# Patient Record
Sex: FEMALE | Race: WHITE | NOT HISPANIC OR LATINO | Employment: FULL TIME | URBAN - METROPOLITAN AREA
[De-identification: names, ages, dates, MRNs, and addresses within clinical notes are randomized per-mention and may not be internally consistent; named-entity substitution may affect disease eponyms.]

---

## 2019-06-18 ENCOUNTER — HOSPITAL ENCOUNTER (EMERGENCY)
Facility: HOSPITAL | Age: 59
Discharge: HOME | End: 2019-06-18
Attending: EMERGENCY MEDICINE | Admitting: EMERGENCY MEDICINE
Payer: COMMERCIAL

## 2019-06-18 VITALS
RESPIRATION RATE: 16 BRPM | HEART RATE: 86 BPM | WEIGHT: 153 LBS | DIASTOLIC BLOOD PRESSURE: 62 MMHG | SYSTOLIC BLOOD PRESSURE: 135 MMHG | OXYGEN SATURATION: 95 %

## 2019-06-18 DIAGNOSIS — S01.81XA LACERATION OF FOREHEAD, INITIAL ENCOUNTER: Primary | ICD-10-CM

## 2019-06-18 PROCEDURE — 90715 TDAP VACCINE 7 YRS/> IM: CPT | Performed by: EMERGENCY MEDICINE

## 2019-06-18 PROCEDURE — 99282 EMERGENCY DEPT VISIT SF MDM: CPT

## 2019-06-18 PROCEDURE — 90471 IMMUNIZATION ADMIN: CPT

## 2019-06-18 RX ORDER — GINSENG 100 MG
1 CAPSULE ORAL ONCE
Status: COMPLETED | OUTPATIENT
Start: 2019-06-18 | End: 2019-06-18

## 2019-06-18 RX ORDER — LIDOCAINE HYDROCHLORIDE AND EPINEPHRINE 10; 10 MG/ML; UG/ML
1 INJECTION, SOLUTION INFILTRATION; PERINEURAL ONCE
Status: COMPLETED | OUTPATIENT
Start: 2019-06-18 | End: 2019-06-18

## 2019-06-18 RX ADMIN — TETANUS TOXOID, REDUCED DIPHTHERIA TOXOID AND ACELLULAR PERTUSSIS VACCINE, ADSORBED 0.5 ML: 5; 2.5; 8; 8; 2.5 SUSPENSION INTRAMUSCULAR at 22:54

## 2019-06-18 RX ADMIN — BACITRACIN ZINC 1 SMALL APPLICATION: 500 OINTMENT TOPICAL at 23:23

## 2019-06-18 RX ADMIN — LIDOCAINE HYDROCHLORIDE,EPINEPHRINE BITARTRATE 1 ML: 10; .01 INJECTION, SOLUTION INFILTRATION; PERINEURAL at 22:55

## 2021-02-12 NOTE — PROGRESS NOTES
FAMILY PRACTICE HEALTH MAINTENANCE OFFICE VISIT  [facility] - [facility]    NAME: Jenna Parada  AGE: 62 y o  SEX: female  : 1960     DATE: 2021    Assessment and Plan     1  Well adult exam    2  Encounter for hepatitis C screening test for low risk patient  -     Hepatitis C antibody; Future    3  Screening for cardiovascular condition  -     CBC and differential; Future  -     Comprehensive metabolic panel; Future  -     Lipid panel; Future  -     CBC and differential  -     Comprehensive metabolic panel  -     Lipid panel    4  Screening breast examination  -     Mammo screening bilateral w 3d & cad; Future; Expected date: 2021    5  Colon cancer screening  -     Cologuard; Future    6  BMI 24 0-24 9, adult        · Patient Counseling:   · Nutrition: Stressed importance of a well balanced diet, moderation of sodium/saturated fat, caloric balance and sufficient intake of fiber  · Exercise: Stressed the importance of regular exercise with a goal of 150 minutes per week  · Dental Health: Discussed daily flossing and brushing and regular dental visits     · Immunizations reviewed: Up To Date  · Discussed benefits of:  Colon Cancer Screening, Mammogram , Cervical Cancer screening and Screening labs  BMI Counseling: Body mass index is 24 37 kg/m²  Discussed with patient's BMI with her  The BMI is normal     Return in about 1 year (around 2022) for please print cologuard order  Chief Complaint     Chief Complaint   Patient presents with    Physical Exam     [facility]       History of Present Illness     NP, here for CPE  Has no complaints or issues  Has gyn for routine care  She recently lost about 10 pounds on [employer]olve  Walks for exercise          Well Adult Physical   Patient here for a comprehensive physical exam       Diet and Physical Activity  Diet: well balanced diet  Exercise: frequently      Depression Screen  PHQ-9 Depression Screening    PHQ-9:   Frequency of the following problems over the past two weeks:      Little interest or pleasure in doing things: 0 - not at all  Feeling down, depressed, or hopeless: 0 - not at all  PHQ-2 Score: 0          General Health  Hearing: Normal:  bilateral  Vision: no vision problems and wears glasses  Dental: regular dental visits    Reproductive Health  No issues  and Follows with gynecologist      The following portions of the patient's history were reviewed and updated as appropriate: allergies, current medications, past family history, past medical history, past social history, past surgical history and problem list     Review of Systems     Review of Systems   Constitutional: Negative  HENT: Negative  Eyes: Negative  Respiratory: Negative  Cardiovascular: Negative  Gastrointestinal: Negative  Endocrine: Negative  Genitourinary: Negative  Musculoskeletal: Negative  Skin: Negative  Allergic/Immunologic: Negative  Neurological: Negative  Hematological: Negative  Psychiatric/Behavioral: Negative  Past Medical History     History reviewed  No pertinent past medical history  Past Surgical History     Past Surgical History:   Procedure Laterality Date     SECTION         Social History     Social History     Socioeconomic History    Marital status: /Civil Union     Spouse name: None    Number of children: None    Years of education: None    Highest education level: None   Occupational History    None   Social Needs    Financial resource strain: None    Food insecurity     Worry: None     Inability: None    Transportation needs     Medical: None     Non-medical: None   Tobacco Use    Smoking status: Former Smoker    Smokeless tobacco: Never Used   Substance and Sexual Activity    Alcohol use:  Yes     Alcohol/week: 8 0 standard drinks     Types: 8 Glasses of wine per week    Drug use: Never    Sexual activity: None   Lifestyle  Physical activity     Days per week: None     Minutes per session: None    Stress: None   Relationships    Social connections     Talks on phone: None     Gets together: None     Attends [employer] service: None     Active member of club or organization: None     Attends meetings of clubs or organizations: None     Relationship status: None    Intimate partner violence     Fear of current or ex partner: None     Emotionally abused: None     Physically abused: None     Forced sexual activity: None   Other Topics Concern    None   Social History Narrative    None       Family History     Family History   Problem Relation Age of Onset    Diabetes Mother     Hypertension Mother     No Known Problems Father     No Known Problems Brother        Current Medications     No current outpatient medications on file  Allergies     Allergies   Allergen Reactions    Erythromycin GI Intolerance    Levaquin [Levofloxacin] Rash    Macrobid [Nitrofurantoin] Rash       Objective     /76   Pulse 72   Temp 97 6 °F (36 4 °C)   Resp 16   Ht 5' 6" (1 676 m)   Wt 68 5 kg (151 lb)   BMI 24 37 kg/m²      Physical Exam  Constitutional:       General: She is not in acute distress  Appearance: She is well-developed  She is not diaphoretic  HENT:      Head: Normocephalic and atraumatic  Right Ear: External ear normal       Left Ear: External ear normal    Neck:      Musculoskeletal: Normal range of motion and neck supple  Thyroid: No thyromegaly  Cardiovascular:      Rate and Rhythm: Normal rate and regular rhythm  Heart sounds: Normal heart sounds  No murmur  No friction rub  No gallop  Pulmonary:      Effort: Pulmonary effort is normal       Breath sounds: Normal breath sounds  No wheezing or rales  Abdominal:      General: Bowel sounds are normal       Palpations: Abdomen is soft  There is no mass  Tenderness: There is no abdominal tenderness  There is no rebound  Musculoskeletal: Normal range of motion  General: No deformity  Lymphadenopathy:      Cervical: No cervical adenopathy  Skin:     General: Skin is warm and dry  Findings: No rash  Neurological:      Mental Status: She is alert and oriented to person, place, and time  Cranial Nerves: No cranial nerve deficit  Motor: No abnormal muscle tone  Coordination: Coordination normal       Deep Tendon Reflexes: Reflexes are normal and symmetric  Reflexes normal    Psychiatric:         Behavior: Behavior normal          Thought Content:  Thought content normal          Judgment: Judgment normal             Visual Acuity Screening    Right eye Left eye Both eyes   Without correction:      With correction: 20/20 20/20 20/20           Christine Puri, [address]

## 2021-02-17 ENCOUNTER — OFFICE VISIT (OUTPATIENT)
Dept: FAMILY MEDICINE CLINIC | Facility: CLINIC | Age: 61
End: 2021-02-17
Payer: COMMERCIAL

## 2021-02-17 VITALS
HEIGHT: 66 IN | BODY MASS INDEX: 24.27 KG/M2 | RESPIRATION RATE: 16 BRPM | SYSTOLIC BLOOD PRESSURE: 124 MMHG | DIASTOLIC BLOOD PRESSURE: 76 MMHG | TEMPERATURE: 97.6 F | HEART RATE: 72 BPM | WEIGHT: 151 LBS

## 2021-02-17 DIAGNOSIS — Z13.6 SCREENING FOR CARDIOVASCULAR CONDITION: ICD-10-CM

## 2021-02-17 DIAGNOSIS — Z00.00 WELL ADULT EXAM: Primary | ICD-10-CM

## 2021-02-17 DIAGNOSIS — Z11.59 ENCOUNTER FOR HEPATITIS C SCREENING TEST FOR LOW RISK PATIENT: ICD-10-CM

## 2021-02-17 DIAGNOSIS — Z12.11 COLON CANCER SCREENING: ICD-10-CM

## 2021-02-17 DIAGNOSIS — Z12.39 SCREENING BREAST EXAMINATION: ICD-10-CM

## 2021-02-17 PROCEDURE — 99386 PREV VISIT NEW AGE 40-64: CPT | Performed by: INTERNAL MEDICINE

## 2021-02-17 PROCEDURE — 1036F TOBACCO NON-USER: CPT | Performed by: INTERNAL MEDICINE

## 2021-02-17 PROCEDURE — 3008F BODY MASS INDEX DOCD: CPT | Performed by: INTERNAL MEDICINE

## 2021-02-17 PROCEDURE — 3725F SCREEN DEPRESSION PERFORMED: CPT | Performed by: INTERNAL MEDICINE

## 2021-02-25 DIAGNOSIS — Z12.39 SCREENING BREAST EXAMINATION: ICD-10-CM

## 2021-02-27 LAB
ALBUMIN SERPL-MCNC: 4.3 G/DL (ref 3.6–5.1)
ALBUMIN/GLOB SERPL: 1.7 (CALC) (ref 1–2.5)
ALP SERPL-CCNC: 82 U/L (ref 37–153)
ALT SERPL-CCNC: 13 U/L (ref 6–29)
AST SERPL-CCNC: 15 U/L (ref 10–35)
BASOPHILS # BLD AUTO: 37 CELLS/UL (ref 0–200)
BASOPHILS NFR BLD AUTO: 0.6 %
BILIRUB SERPL-MCNC: 0.7 MG/DL (ref 0.2–1.2)
BUN SERPL-MCNC: 14 MG/DL (ref 7–25)
BUN/CREAT SERPL: NORMAL (CALC) (ref 6–22)
CALCIUM SERPL-MCNC: 9.5 MG/DL (ref 8.6–10.4)
CHLORIDE SERPL-SCNC: 102 MMOL/L (ref 98–110)
CHOLEST SERPL-MCNC: 208 MG/DL
CHOLEST/HDLC SERPL: 2.3 (CALC)
CO2 SERPL-SCNC: 28 MMOL/L (ref 20–32)
CREAT SERPL-MCNC: 0.79 MG/DL (ref 0.5–1.05)
EOSINOPHIL # BLD AUTO: 118 CELLS/UL (ref 15–500)
EOSINOPHIL NFR BLD AUTO: 1.9 %
ERYTHROCYTE [DISTWIDTH] IN BLOOD BY AUTOMATED COUNT: 12.2 % (ref 11–15)
GLOBULIN SER CALC-MCNC: 2.6 G/DL (CALC) (ref 1.9–3.7)
GLUCOSE SERPL-MCNC: 94 MG/DL (ref 65–99)
HCT VFR BLD AUTO: 43.4 % (ref 35–45)
HCV AB S/CO SERPL IA: 0.01
HCV AB SERPL QL IA: NORMAL
HDLC SERPL-MCNC: 91 MG/DL
HGB BLD-MCNC: 14.3 G/DL (ref 11.7–15.5)
LDLC SERPL CALC-MCNC: 100 MG/DL (CALC)
LYMPHOCYTES # BLD AUTO: 1724 CELLS/UL (ref 850–3900)
LYMPHOCYTES NFR BLD AUTO: 27.8 %
MCH RBC QN AUTO: 30.4 PG (ref 27–33)
MCHC RBC AUTO-ENTMCNC: 32.9 G/DL (ref 32–36)
MCV RBC AUTO: 92.3 FL (ref 80–100)
MONOCYTES # BLD AUTO: 502 CELLS/UL (ref 200–950)
MONOCYTES NFR BLD AUTO: 8.1 %
NEUTROPHILS # BLD AUTO: 3819 CELLS/UL (ref 1500–7800)
NEUTROPHILS NFR BLD AUTO: 61.6 %
NONHDLC SERPL-MCNC: 117 MG/DL (CALC)
PLATELET # BLD AUTO: 315 THOUSAND/UL (ref 140–400)
PMV BLD REES-ECKER: 9.5 FL (ref 7.5–12.5)
POTASSIUM SERPL-SCNC: 4.3 MMOL/L (ref 3.5–5.3)
PROT SERPL-MCNC: 6.9 G/DL (ref 6.1–8.1)
RBC # BLD AUTO: 4.7 MILLION/UL (ref 3.8–5.1)
SL AMB EGFR AFRICAN AMERICAN: 95 ML/MIN/1.73M2
SL AMB EGFR NON AFRICAN AMERICAN: 82 ML/MIN/1.73M2
SODIUM SERPL-SCNC: 139 MMOL/L (ref 135–146)
TRIGL SERPL-MCNC: 79 MG/DL
WBC # BLD AUTO: 6.2 THOUSAND/UL (ref 3.8–10.8)

## 2021-04-13 DIAGNOSIS — Z23 ENCOUNTER FOR IMMUNIZATION: ICD-10-CM

## 2021-04-22 ENCOUNTER — IMMUNIZATIONS (OUTPATIENT)
Dept: FAMILY MEDICINE CLINIC | Facility: HOSPITAL | Age: 61
End: 2021-04-22

## 2021-04-22 DIAGNOSIS — Z23 ENCOUNTER FOR IMMUNIZATION: Primary | ICD-10-CM

## 2021-04-22 PROCEDURE — 91300 SARS-COV-2 / COVID-19 MRNA VACCINE (PFIZER-BIONTECH) 30 MCG: CPT | Performed by: PHYSICIAN ASSISTANT

## 2021-04-22 PROCEDURE — 0001A SARS-COV-2 / COVID-19 MRNA VACCINE (PFIZER-BIONTECH) 30 MCG: CPT | Performed by: PHYSICIAN ASSISTANT

## 2021-05-13 ENCOUNTER — IMMUNIZATIONS (OUTPATIENT)
Dept: FAMILY MEDICINE CLINIC | Facility: HOSPITAL | Age: 61
End: 2021-05-13

## 2021-05-13 DIAGNOSIS — Z23 ENCOUNTER FOR IMMUNIZATION: Primary | ICD-10-CM

## 2021-05-13 PROCEDURE — 91300 SARS-COV-2 / COVID-19 MRNA VACCINE (PFIZER-BIONTECH) 30 MCG: CPT

## 2021-05-13 PROCEDURE — 0002A SARS-COV-2 / COVID-19 MRNA VACCINE (PFIZER-BIONTECH) 30 MCG: CPT

## 2021-11-23 ENCOUNTER — IMMUNIZATIONS (OUTPATIENT)
Dept: FAMILY MEDICINE CLINIC | Facility: HOSPITAL | Age: 61
End: 2021-11-23

## 2021-11-23 DIAGNOSIS — Z23 ENCOUNTER FOR IMMUNIZATION: Primary | ICD-10-CM

## 2021-11-23 PROCEDURE — 91306 COVID-19 MODERNA VACC 0.25 ML BOOSTER: CPT

## 2021-11-23 PROCEDURE — 0064A COVID-19 MODERNA VACC 0.25 ML BOOSTER: CPT

## 2023-01-31 ENCOUNTER — RA CDI HCC (OUTPATIENT)
Dept: OTHER | Facility: HOSPITAL | Age: 63
End: 2023-01-31

## 2023-01-31 NOTE — PROGRESS NOTES
[facility] 75  coding opportunities       Chart reviewed, no opportunity found: CHART REVIEWED, NO OPPORTUNITY FOUND        Patients Insurance        Commercial Insurance: [employer]

## 2023-02-06 ENCOUNTER — OFFICE VISIT (OUTPATIENT)
Dept: FAMILY MEDICINE CLINIC | Facility: CLINIC | Age: 63
End: 2023-02-06

## 2023-02-06 VITALS
WEIGHT: 145 LBS | OXYGEN SATURATION: 98 % | HEIGHT: 66 IN | HEART RATE: 54 BPM | TEMPERATURE: 97.7 F | DIASTOLIC BLOOD PRESSURE: 74 MMHG | SYSTOLIC BLOOD PRESSURE: 108 MMHG | RESPIRATION RATE: 16 BRPM | BODY MASS INDEX: 23.3 KG/M2

## 2023-02-06 DIAGNOSIS — Z13.6 SCREENING FOR CARDIOVASCULAR CONDITION: ICD-10-CM

## 2023-02-06 DIAGNOSIS — Z12.39 SCREENING BREAST EXAMINATION: ICD-10-CM

## 2023-02-06 DIAGNOSIS — Z00.00 WELL ADULT EXAM: Primary | ICD-10-CM

## 2023-02-06 NOTE — PROGRESS NOTES
FAMILY PRACTICE HEALTH MAINTENANCE OFFICE VISIT  [facility]    NAME: Stepan Gonzalez  AGE: 65 y o  SEX: female  : 1960     DATE: 2023    Assessment and Plan     1  Well adult exam    2  Screening breast examination  -     Mammo screening bilateral w 3d & cad; Future; Expected date: 2023    3  Screening for cardiovascular condition  -     CBC and differential; Future  -     Comprehensive metabolic panel; Future  -     Lipid panel; Future      Patient Counseling:   Nutrition: Stressed importance of a well balanced diet, moderation of sodium/saturated fat, caloric balance and sufficient intake of fiber  Exercise: Stressed the importance of regular exercise with a goal of 150 minutes per week  Dental Health: Discussed daily flossing and brushing and regular dental visits     Immunizations reviewed: Up To Date  Discussed benefits of:  Colon Cancer Screening, Mammogram , Cervical Cancer screening and Screening labs  BMI Counseling: Body mass index is 23 4 kg/m²  Discussed with patient's BMI with her   The BMI is normal     Return in about 1 year (around 2024) for Annual physical         Chief Complaint     Chief Complaint   Patient presents with   • Physical Exam     L Beal/LPN       History of Present Illness     HPI    Well Adult Physical   Patient here for a comprehensive physical exam       Diet and Physical Activity  Diet: well balanced diet  Exercise: daily      Depression Screen  PHQ-2/9 Depression Screening    Little interest or pleasure in doing things: 0 - not at all  Feeling down, depressed, or hopeless: 0 - not at all  PHQ-2 Score: 0  PHQ-2 Interpretation: Negative depression screen          General Health  Hearing: Normal:  bilateral  Vision: no vision problems  Dental: regular dental visits    Reproductive Health  No issues  and Follows with gynecologist      The following portions of the patient's history were reviewed and updated as appropriate: allergies, current medications, past family history, past medical history, past social history, past surgical history and problem list     Review of Systems     Review of Systems   Constitutional: Negative  HENT: Negative  Eyes: Negative  Respiratory: Negative  Cardiovascular: Negative  Gastrointestinal: Negative  Endocrine: Negative  Genitourinary: Negative  Musculoskeletal: Negative  Skin: Negative  Allergic/Immunologic: Negative  Neurological: Negative  Hematological: Negative  Psychiatric/Behavioral: Negative  Past Medical History     History reviewed  No pertinent past medical history  Past Surgical History     Past Surgical History:   Procedure Laterality Date   •  SECTION         Social History     Social History     Socioeconomic History   • Marital status: /Civil Union     Spouse name: None   • Number of children: None   • Years of education: None   • Highest education level: None   Occupational History   • None   Tobacco Use   • Smoking status: Former     Packs/day: 0 25     Years: 5 00     Pack years: 1 25     Types: Cigarettes     Start date: 6/15/1979     Quit date: 1/15/1984     Years since quittin 0   • Smokeless tobacco: Never   Vaping Use   • Vaping Use: Never used   Substance and Sexual Activity   • Alcohol use:  Yes     Alcohol/week: 8 0 standard drinks     Types: 8 Glasses of wine per week   • Drug use: Never   • Sexual activity: None   Other Topics Concern   • None   Social History Narrative   • None     Social Determinants of Health     Financial Resource Strain: Not on file   Food Insecurity: Not on file   Transportation Needs: Not on file   Physical Activity: Not on file   Stress: Not on file   Social Connections: Not on file   Intimate Partner Violence: Not on file   Housing Stability: Not on file       Family History     Family History   Problem Relation Age of Onset   • Diabetes Mother    • Hypertension Mother • No Known Problems Father    • No Known Problems Brother        Current Medications     No current outpatient medications on file  Allergies     Allergies   Allergen Reactions   • Erythromycin GI Intolerance   • Levaquin [Levofloxacin] Rash   • Macrobid [Nitrofurantoin] Rash       Objective     /74   Pulse (!) 54   Temp 97 7 °F (36 5 °C) (Tympanic)   Resp 16   Ht 5' 6" (1 676 m)   Wt 65 8 kg (145 lb)   SpO2 98%   BMI 23 40 kg/m²      Physical Exam  Constitutional:       General: She is not in acute distress  Appearance: She is well-developed  She is not diaphoretic  HENT:      Head: Normocephalic and atraumatic  Right Ear: External ear normal       Left Ear: External ear normal    Neck:      Thyroid: No thyromegaly  Cardiovascular:      Rate and Rhythm: Normal rate and regular rhythm  Heart sounds: Normal heart sounds  No murmur heard  No friction rub  No gallop  Pulmonary:      Effort: Pulmonary effort is normal       Breath sounds: Normal breath sounds  No wheezing or rales  Abdominal:      General: Bowel sounds are normal       Palpations: Abdomen is soft  There is no mass  Tenderness: There is no abdominal tenderness  There is no rebound  Musculoskeletal:         General: No deformity  Normal range of motion  Cervical back: Normal range of motion and neck supple  Lymphadenopathy:      Cervical: No cervical adenopathy  Skin:     General: Skin is warm and dry  Findings: No rash  Neurological:      Mental Status: She is alert and oriented to person, place, and time  Cranial Nerves: No cranial nerve deficit  Motor: No abnormal muscle tone  Coordination: Coordination normal       Deep Tendon Reflexes: Reflexes are normal and symmetric  Reflexes normal    Psychiatric:         Behavior: Behavior normal          Thought Content:  Thought content normal          Judgment: Judgment normal            Vision Screening    Right eye Left eye Both eyes   Without correction      With correction 20/20 20/20 20/20   Comments: Per patient               MD ISABEL Delgado [facility]

## 2023-02-14 ENCOUNTER — APPOINTMENT (OUTPATIENT)
Dept: LAB | Facility: CLINIC | Age: 63
End: 2023-02-14

## 2023-02-14 DIAGNOSIS — Z13.6 SCREENING FOR CARDIOVASCULAR CONDITION: ICD-10-CM

## 2023-02-14 LAB
ALBUMIN SERPL BCP-MCNC: 3.7 G/DL (ref 3.5–5)
ALP SERPL-CCNC: 81 U/L (ref 46–116)
ALT SERPL W P-5'-P-CCNC: 22 U/L (ref 12–78)
ANION GAP SERPL CALCULATED.3IONS-SCNC: 5 MMOL/L (ref 4–13)
AST SERPL W P-5'-P-CCNC: 18 U/L (ref 5–45)
BASOPHILS # BLD AUTO: 0.06 THOUSANDS/ÂΜL (ref 0–0.1)
BASOPHILS NFR BLD AUTO: 1 % (ref 0–1)
BILIRUB SERPL-MCNC: 0.78 MG/DL (ref 0.2–1)
BUN SERPL-MCNC: 14 MG/DL (ref 5–25)
CALCIUM SERPL-MCNC: 9.2 MG/DL (ref 8.3–10.1)
CHLORIDE SERPL-SCNC: 105 MMOL/L (ref 96–108)
CHOLEST SERPL-MCNC: 222 MG/DL
CO2 SERPL-SCNC: 28 MMOL/L (ref 21–32)
CREAT SERPL-MCNC: 0.79 MG/DL (ref 0.6–1.3)
EOSINOPHIL # BLD AUTO: 0.15 THOUSAND/ÂΜL (ref 0–0.61)
EOSINOPHIL NFR BLD AUTO: 3 % (ref 0–6)
ERYTHROCYTE [DISTWIDTH] IN BLOOD BY AUTOMATED COUNT: 12.5 % (ref 11.6–15.1)
GFR SERPL CREATININE-BSD FRML MDRD: 81 ML/MIN/1.73SQ M
GLUCOSE P FAST SERPL-MCNC: 102 MG/DL (ref 65–99)
HCT VFR BLD AUTO: 45.2 % (ref 34.8–46.1)
HDLC SERPL-MCNC: 122 MG/DL
HGB BLD-MCNC: 14.4 G/DL (ref 11.5–15.4)
IMM GRANULOCYTES # BLD AUTO: 0.03 THOUSAND/UL (ref 0–0.2)
IMM GRANULOCYTES NFR BLD AUTO: 1 % (ref 0–2)
LDLC SERPL CALC-MCNC: 90 MG/DL (ref 0–100)
LYMPHOCYTES # BLD AUTO: 1.91 THOUSANDS/ÂΜL (ref 0.6–4.47)
LYMPHOCYTES NFR BLD AUTO: 33 % (ref 14–44)
MCH RBC QN AUTO: 30.4 PG (ref 26.8–34.3)
MCHC RBC AUTO-ENTMCNC: 31.9 G/DL (ref 31.4–37.4)
MCV RBC AUTO: 96 FL (ref 82–98)
MONOCYTES # BLD AUTO: 0.54 THOUSAND/ÂΜL (ref 0.17–1.22)
MONOCYTES NFR BLD AUTO: 9 % (ref 4–12)
NEUTROPHILS # BLD AUTO: 3.18 THOUSANDS/ÂΜL (ref 1.85–7.62)
NEUTS SEG NFR BLD AUTO: 53 % (ref 43–75)
NONHDLC SERPL-MCNC: 100 MG/DL
NRBC BLD AUTO-RTO: 0 /100 WBCS
PLATELET # BLD AUTO: 338 THOUSANDS/UL (ref 149–390)
PMV BLD AUTO: 9.3 FL (ref 8.9–12.7)
POTASSIUM SERPL-SCNC: 4.2 MMOL/L (ref 3.5–5.3)
PROT SERPL-MCNC: 7.2 G/DL (ref 6.4–8.4)
RBC # BLD AUTO: 4.73 MILLION/UL (ref 3.81–5.12)
SODIUM SERPL-SCNC: 138 MMOL/L (ref 135–147)
TRIGL SERPL-MCNC: 48 MG/DL
WBC # BLD AUTO: 5.87 THOUSAND/UL (ref 4.31–10.16)

## 2023-03-07 DIAGNOSIS — Z12.39 SCREENING BREAST EXAMINATION: ICD-10-CM

## 2023-03-08 DIAGNOSIS — R92.8 ABNORMAL MAMMOGRAM: Primary | ICD-10-CM

## 2024-04-04 ENCOUNTER — OFFICE VISIT (OUTPATIENT)
Dept: FAMILY MEDICINE CLINIC | Facility: CLINIC | Age: 64
End: 2024-04-04
Payer: COMMERCIAL

## 2024-04-04 VITALS
HEIGHT: 66 IN | TEMPERATURE: 97.5 F | BODY MASS INDEX: 23.4 KG/M2 | HEART RATE: 73 BPM | WEIGHT: 145.6 LBS | DIASTOLIC BLOOD PRESSURE: 76 MMHG | SYSTOLIC BLOOD PRESSURE: 118 MMHG

## 2024-04-04 DIAGNOSIS — Z12.39 SCREENING BREAST EXAMINATION: ICD-10-CM

## 2024-04-04 DIAGNOSIS — Z13.6 SCREENING FOR CARDIOVASCULAR CONDITION: ICD-10-CM

## 2024-04-04 DIAGNOSIS — Z12.11 COLON CANCER SCREENING: ICD-10-CM

## 2024-04-04 DIAGNOSIS — Z00.00 WELL ADULT EXAM: Primary | ICD-10-CM

## 2024-04-04 PROCEDURE — 99396 PREV VISIT EST AGE 40-64: CPT | Performed by: INTERNAL MEDICINE

## 2024-04-04 NOTE — PROGRESS NOTES
FAMILY PRACTICE HEALTH MAINTENANCE OFFICE VISIT  [facility]    NAME: Ines Aviles  AGE: 63 y.o. SEX: female  : 1960     DATE: 2024    Assessment and Plan     1. Well adult exam  -     Ambulatory Referral to Obstetrics / Gynecology; Future    2. Screening for cardiovascular condition  -     CBC; Future  -     Comprehensive metabolic panel; Future  -     Lipid panel; Future    3. Screening breast examination  -     Mammo screening bilateral w 3d & cad; Future    4. Colon cancer screening  -     Cologuard        Patient Counseling:   Nutrition: Stressed importance of a well balanced diet, moderation of sodium/saturated fat, caloric balance and sufficient intake of fiber  Exercise: Stressed the importance of regular exercise with a goal of 150 minutes per week  Dental Health: Discussed daily flossing and brushing and regular dental visits     Immunizations reviewed: Up To Date  Discussed benefits of:  Colon Cancer Screening, Mammogram , Cervical Cancer screening, and Screening labs.  BMI Counseling: Body mass index is 23.86 kg/m². Discussed with patient's BMI with her. The BMI is normal.    Return in about 1 year (around 2025) for Annual physical.        Chief Complaint     Chief Complaint   Patient presents with   • Physical Exam     [facility] CMA        History of Present Illness     Here for CPE. Has not seen gyn in many years.  Denies complaints or issues today.        Well Adult Physical   Patient here for a comprehensive physical exam.      Diet and Physical Activity  Diet: well balanced diet  Exercise: frequently      Depression Screen  PHQ-2/9 Depression Screening    Little interest or pleasure in doing things: 0 - not at all  Feeling down, depressed, or hopeless: 0 - not at all  PHQ-2 Score: 0  PHQ-2 Interpretation: Negative depression screen          General Health  Hearing: Normal:  bilateral  Vision: no vision problems  Dental: regular dental  visits    Reproductive Health  No issues  and referred to gyn      The following portions of the patient's history were reviewed and updated as appropriate: allergies, current medications, past family history, past medical history, past social history, past surgical history and problem list.    Review of Systems     Review of Systems   Constitutional:  Negative for chills and fever.   HENT:  Negative for ear pain and sore throat.    Eyes:  Negative for pain and visual disturbance.   Respiratory:  Negative for cough and shortness of breath.    Cardiovascular:  Negative for chest pain and palpitations.   Gastrointestinal:  Negative for abdominal pain and vomiting.   Genitourinary:  Negative for dysuria and hematuria.   Musculoskeletal:  Negative for arthralgias and back pain.   Skin:  Negative for color change and rash.   Neurological:  Negative for seizures and syncope.   All other systems reviewed and are negative.      Past Medical History     History reviewed. No pertinent past medical history.    Past Surgical History     Past Surgical History:   Procedure Laterality Date   •  SECTION         Social History     Social History     Socioeconomic History   • Marital status: /Civil Union     Spouse name: None   • Number of children: None   • Years of education: None   • Highest education level: None   Occupational History   • None   Tobacco Use   • Smoking status: Former     Current packs/day: 0.00     Average packs/day: 0.3 packs/day for 5.0 years (1.2 ttl pk-yrs)     Types: Cigarettes     Start date: 6/15/1979     Quit date: 1/15/1984     Years since quittin.2     Passive exposure: Never   • Smokeless tobacco: Never   Vaping Use   • Vaping status: Never Used   Substance and Sexual Activity   • Alcohol use: Yes     Alcohol/week: 8.0 standard drinks of alcohol     Types: 8 Glasses of wine per week   • Drug use: Never   • Sexual activity: None   Other Topics Concern   • None   Social History  "Narrative   • None     Social Determinants of Health     Financial Resource Strain: Not on file   Food Insecurity: Not on file   Transportation Needs: Not on file   Physical Activity: Not on file   Stress: Not on file   Social Connections: Not on file   Intimate Partner Violence: Not on file   Housing Stability: Not on file       Family History     Family History   Problem Relation Age of Onset   • Diabetes Mother    • Hypertension Mother    • No Known Problems Father    • No Known Problems Brother        Current Medications     No current outpatient medications on file.     Allergies     Allergies   Allergen Reactions   • Erythromycin GI Intolerance   • Levaquin [Levofloxacin] Rash   • Macrobid [Nitrofurantoin] Rash       Objective     /76   Pulse 73   Temp 97.5 °F (36.4 °C)   Ht 5' 5.5\" (1.664 m)   Wt 66 kg (145 lb 9.6 oz)   BMI 23.86 kg/m²      Physical Exam  Constitutional:       General: She is not in acute distress.     Appearance: She is well-developed. She is not diaphoretic.   HENT:      Head: Normocephalic and atraumatic.      Right Ear: External ear normal.      Left Ear: External ear normal.   Neck:      Thyroid: No thyromegaly.   Cardiovascular:      Rate and Rhythm: Normal rate and regular rhythm.      Heart sounds: Normal heart sounds. No murmur heard.     No friction rub. No gallop.   Pulmonary:      Effort: Pulmonary effort is normal.      Breath sounds: Normal breath sounds. No wheezing or rales.   Abdominal:      General: Bowel sounds are normal.      Palpations: Abdomen is soft. There is no mass.      Tenderness: There is no abdominal tenderness. There is no rebound.   Musculoskeletal:         General: No deformity. Normal range of motion.      Cervical back: Normal range of motion and neck supple.   Lymphadenopathy:      Cervical: No cervical adenopathy.   Skin:     General: Skin is warm and dry.      Findings: No rash.   Neurological:      Mental Status: She is alert and oriented to " person, place, and time.      Cranial Nerves: No cranial nerve deficit.      Motor: No abnormal muscle tone.      Coordination: Coordination normal.      Deep Tendon Reflexes: Reflexes are normal and symmetric. Reflexes normal.   Psychiatric:         Behavior: Behavior normal.         Thought Content: Thought content normal.         Judgment: Judgment normal.           Vision Screening    Right eye Left eye Both eyes   Without correction      With correction 20/25 20/25 20/25           Rama Michelle MD  [facility]

## 2024-04-24 LAB — COLOGUARD RESULT REPORTABLE: POSITIVE

## 2024-04-25 DIAGNOSIS — R19.5 POSITIVE COLORECTAL CANCER SCREENING USING COLOGUARD TEST: Primary | ICD-10-CM

## 2024-04-26 ENCOUNTER — PREP FOR PROCEDURE (OUTPATIENT)
Age: 64
End: 2024-04-26

## 2024-04-26 ENCOUNTER — TELEPHONE (OUTPATIENT)
Age: 64
End: 2024-04-26

## 2024-04-26 DIAGNOSIS — Z12.11 SCREENING FOR COLON CANCER: Primary | ICD-10-CM

## 2024-04-26 NOTE — TELEPHONE ENCOUNTER
Scheduled date of colonoscopy (as of today):6/5/24    Physician performing colonoscopy:Dr Christensen    Location of colonoscopy:[facility]    Bowel prep reviewed with patient:miralax/dulcolax    Instructions reviewed with patient by:prep instructions sent via [address]    Clearances: N/A

## 2024-04-26 NOTE — TELEPHONE ENCOUNTER
24  Screened by: Radha Ellis    Referring Provider Dr Michelle    Pre- Screenin'5 145 BMI 23.86    Has patient been referred for a routine screening Colonoscopy? yes  Is the patient between 45-75 years old? yes      Previous Colonoscopy no   If yes:    Date:     Facility:     Reason:         Does the patient want to see a Gastroenterologist prior to their procedure OR are they having any GI symptoms? no    Has the patient been hospitalized or had abdominal surgery in the past 6 months? no    Does the patient use supplemental oxygen? no    Does the patient take Coumadin, Lovenox, Plavix, Elliquis, Xarelto, or other blood thinning medication? no    Has the patient had a stroke, cardiac event, or stent placed in the past year? no      If patient is between 45yrs - 49yrs, please advise patient that we will have to confirm benefits & coverage with their insurance company for a routine screening colonoscopy.

## 2024-05-06 ENCOUNTER — TELEPHONE (OUTPATIENT)
Dept: HEMATOLOGY ONCOLOGY | Facility: CLINIC | Age: 64
End: 2024-05-06

## 2024-05-06 ENCOUNTER — TELEPHONE (OUTPATIENT)
Age: 64
End: 2024-05-06

## 2024-05-06 NOTE — TELEPHONE ENCOUNTER
I called patient and discussed options with her.  She has already reached out to the breast center at [facility] for a surgical appointment. I advised her to get her surgical opinion first and we can get her to medical oncology if need be after that.  Asked her to call with any additional issues.

## 2024-05-06 NOTE — TELEPHONE ENCOUNTER
Patient called requesting a call back from Dr Michelle. Has some questions and looking for guidance on her Mammogram just had. (Results scanned into chart) [phone]

## 2024-05-07 ENCOUNTER — TELEPHONE (OUTPATIENT)
Dept: HEMATOLOGY ONCOLOGY | Facility: CLINIC | Age: 64
End: 2024-05-07

## 2024-05-07 NOTE — TELEPHONE ENCOUNTER
I called Ines in response to a referral that was received for patient to establish care with Surgical Oncology.     Outreach was made to schedule a consultation.    I left a voicemail explaining the reason for my call and advised patient to call \[facility]\"" at [phone].  Another attempt will be made to contact patient.    Schedule patient in a cancer spot,and send to breast team high priority for record retrieval.

## 2024-05-09 ENCOUNTER — PATIENT OUTREACH (OUTPATIENT)
Dept: HEMATOLOGY ONCOLOGY | Facility: CLINIC | Age: 64
End: 2024-05-09

## 2024-05-09 ENCOUNTER — DOCUMENTATION (OUTPATIENT)
Dept: HEMATOLOGY ONCOLOGY | Facility: CLINIC | Age: 64
End: 2024-05-09

## 2024-05-09 DIAGNOSIS — C50.912 MALIGNANT NEOPLASM OF LEFT FEMALE BREAST, UNSPECIFIED ESTROGEN RECEPTOR STATUS, UNSPECIFIED SITE OF BREAST (HCC): Primary | ICD-10-CM

## 2024-05-09 NOTE — PROGRESS NOTES
Breast Cancer Nurse Navigation     Chart reviewed for progress of cancer care, appointments verified.     Surgical Oncology:  Dr Anguiano 24    Medical Oncology:TBD    Radiation Oncology:TBD    Genetics: referral placed    Diagnosis: Invasive ductal carcinoma receptors pending    Imagin2021 Mammo screening bilateral w 3d & cad   23 Mammo screening bilateral w 3d & cad   23 Mammo diagnostic right w 3d & cad   2024 Mammogram Diagnostic w/SIDDHARTH Left, US Breast w/Elastography Bilateral   Concordant  Any further needs:   Called [facility] for copies imaging for last 5 years on a disc and reports. Gave them the address to mail the disc and the reports. Confirmed information.     Oncology Care Coordinator and Nurse Navigator will outreach patient and follow progress.      Will have OCC outreach patient to verify appointments and offer support.  Any clinical questions to be directed to ONN or office nurse.

## 2024-05-09 NOTE — PROGRESS NOTES
Breast Oncology Nurse Navigator    Called patient for initial outreach from nurse navigator.  Introduced myself and my role as nurse navigator as part of her care team.   Confirmed upcoming appointment with Dr Anguiano for May 20th. Provided patient with instructions and confirmed date and time. Patient live her  and has a great support system surrounding her. We discussed the initial consult and what it consists of. I explained Dr Anguiano will review her diagnosis with her in detail, she will discuss recommendations for surgery and treatment options. I also explained she will most likely meet with a medical and radiation oncologist and described their role in her care. I also explained Dr Anguiano may recommend a DARCIE clip placed prior to surgery and explained what it is to her.      She does have family history of breast cancer on her maternal side. Her mothers 1st cousin had breast cancer. We discussed genetics and she agreed to testing. A referral was placed.     Referral placed to oncology social worker.    Discussed the Cancer Support Community and some of the programs and services offered. I told her Breast Cancer Support Group  [facility] offers as well    Patient has my contact information and knows she can reach out with questions.     General assessment completed.

## 2024-05-09 NOTE — PROGRESS NOTES
Intake received, chart reviewed for need of external records.  Consulting: Dr. Anguiano  Scheduled on 5-  Dx: Invasive Mammary Carcinoma      Pathology requested:   From [facility] via fax [phone], phone [phone]  Accession # [id number]  Slides will be sent directly to [facility] Pathology lab at [address] [place] [address].     Images requested:  From [facility] via fax [phone], phone [phone]  If not uploaded electronically via [facility], disks will be sent directly to the Radiology Reading Room.     **Last 5 years of breast imaging requested**    Routed to consulting providers team.

## 2024-05-10 ENCOUNTER — TELEPHONE (OUTPATIENT)
Dept: GENETICS | Facility: CLINIC | Age: 64
End: 2024-05-10

## 2024-05-10 ENCOUNTER — PATIENT OUTREACH (OUTPATIENT)
Dept: HEMATOLOGY ONCOLOGY | Facility: CLINIC | Age: 64
End: 2024-05-10

## 2024-05-10 ENCOUNTER — PATIENT OUTREACH (OUTPATIENT)
Dept: CASE MANAGEMENT | Facility: HOSPITAL | Age: 64
End: 2024-05-10

## 2024-05-10 NOTE — PROGRESS NOTES
OSW received referral for patient. Patient has consult scheduled with Dr. Anguiano on 5/20/24, OSW will follow for plan.

## 2024-05-10 NOTE — PROGRESS NOTES
I called patient to inform her we will need a medical records release filled out to obtain her pathology slides. I confirmed her email and I emailed the release to her. She will fill this out and email it back to me . I will then sen this to [employer].

## 2024-05-10 NOTE — TELEPHONE ENCOUNTER
I called and left a message for the patient to begin STAT genetic testing process. Direct number provide in the message for a call back.

## 2024-05-10 NOTE — TELEPHONE ENCOUNTER
I introduced myself to Ines and let her know that her breast surgeon reached out to the cancer risk and genetics program on her behalf.    I reviewed the following with Ines:    While the majority of cancer occurs by chance, approximately 5-10% of breast cancer has an underlying genetic cause.  Genetic testing is available which can determine if there is an underlying genetic cause to your cancer.  Understanding if there is an underlying genetic cause can:  Provide your surgeon with additional information to help with surgical decisions, treatment decisions and eligibility for clinical trials.    It can determine if you have an increased risk for any additional cancers.  Help family members understand their cancer risk.       We work closely with the [facility] breast surgeons and are reaching out to see if you have interest in genetic testing. The reason we are reaching out at this time is that this result may help your surgeon determine the appropriate type of surgery (i.e. lumpectomy vs mastectomy). This test is not a requirement but can take 5-10 days to complete so we would like to start the process as soon as possible so the results are ready for your appointment with your surgeon.       If you are interested in genetic testing, I can collect your family history and initiate genetic testing for you.        Patient elected to pursue testing     Diagnosis Details:  Invasive Ductal Carcinoma  Left  Hormone receptors pending    Personal History:  Do you have a personal history of any other cancer? No  If yes type/age of diagnosis:     Family history:   Do you have Ashkenazi Hinduism ancestry? No  If yes, maternal, paternal, or both?    Do you have any children? Yes  How many sons? 2  How many daughters? 1  Do any of your children have a history of cancer? No  If yes type/age of diagnosis:     Do you have any brothers or sisters? Yes  How many brothers? 1  How many sisters? 0  Are they from the same parents?  Yes  If no how maternal/paternal half-siblings:  Do any of your brothers or sisters have a history of cancer? No  If yes who and the type/age of diagnosis:           Do you have nieces or nephews? Yes  Do any of them have a history of cancer? No  If yes type/age of diagnosis:    Does your mother have a history of cancer? Yes  If yes, cancer type and age of diagnosis: BCC age 70s; SCC age 70s  Is your mother still living? Yes  Age/Age of death: 86    Thinking about your mother's family (aunts, uncles, cousins, grandparents) is there anyone with a history of cancer? Yes  If yes, list relationship, cancer type and age of diagnosis:  MGM - Lymphoma age 93 (d. 93)  First cousin once removed - Breast Cancer age 50s   Second Cousin (Daughter of cousin above) - Metastatic Breast Cancer age 52 (d. 59)    Does your father have a history of cancer? No  If yes, cancer type and age of diagnosis:   Is your father still living? Yes  Age/age of death: 87    Thinking about your father's family (aunts, uncles, cousins, grandparents) is there anyone with a history of cancer? No  If yes, list relationship, cancer type and age of diagnosis:      Types of Results - Positive, Negative, VUS  Positive Result - May explain personal diagnosis/family history. Can give surgeon information on treatment plan, inform future screening/management or tell a person about other possible risks. Positive results can initiate testing for other family members who may be at risk (children, siblings, etc)  Negative Result - Does not give an explanation. Surgical/treatment plan will be based on clinical presentation and will be part of discussion with surgeon. Negative result cannot be passed down to children, but they are still at elevated risk.  Uncertain Result - Common, but treated like a negative result clinically. 90% are downgraded over time.     [facility] billing policy   Most insurance plans cover the cost of genetic testing. The out-of-pocket  cost varies due to the differences in deductibles, co-payments and co-insurance defined by individual plans but 90% of people pay $100 or less for a genetic test     A blood kit will be mailed to you overnight. Please take the blood kit along with packet of paperwork to any [facility]'s lab to have your blood drawn.    We have genetic counselors available, if you have any additional questions or would like to speak with them we can schedule you a 15 minute appointment.      Plan:  A blood kit was mailed out on 5/10/2024 along with information on genetic testing and the lab's billing policy.     Genetic Testing Preformed: [facility] BRCAplus STAT Panel (13 genes): FREDA, BARD1, BRCA1, BRCA2, CDH1, CHEK2, NF1, PALB2, PTEN, RAD51C, RAD51D, STK11, TP53 with reflex to [facility] CustomNext: Cancer+RNAinsight (59 genes): APC, FREDA, AXIN2, BAP1, BARD1, BMPR1A, BRCA1, BRCA2, BRIP1, CDH1, CDK4, CDKN1B, CDKN2A, CHEK2, CTNNA1, DICER1, EGLN1, EPCAM, FH, FLCN, GREM1, HOXB13, KIF1B, KIT, MAX, MEN1, MET, MITF, MLH1, MSH2, MSH3, MSH6, MUTYH, NF1, NTHL1, PALB2, PDGFRA PMS2, POLD1, POLE, POT1, PTEN, RAD51C, RAD51D, RB1, RET, SDHA, SDHAF2, SDHB, SDHC, SDHD, SMAD4, SMARCA4, STK11, MBRX965, TP53, TSC1, TSC2, VHL     Result Call Information:  I confirmed the patient's mobile number on file as the best number to call with results  I confirmed with the patient that we can leave a voicemail on the provided numbers    Initial results will take approximately 5-12 days to return     Additional results may take up to 2-3 weeks to complete once test is started.    Patient does not have any further questions, declined meeting with genetic counselor    When your results are ready, someone from the genetics team will call you, review the results, and contact your breast surgeon. You will be contacted with any type of result- positive, negative, or uncertain.

## 2024-05-13 ENCOUNTER — TRANSCRIBE ORDERS (OUTPATIENT)
Dept: LAB | Facility: CLINIC | Age: 64
End: 2024-05-13

## 2024-05-13 ENCOUNTER — APPOINTMENT (OUTPATIENT)
Dept: LAB | Facility: CLINIC | Age: 64
End: 2024-05-13
Payer: COMMERCIAL

## 2024-05-13 DIAGNOSIS — C50.912 MALIGNANT NEOPLASM OF LEFT FEMALE BREAST, UNSPECIFIED ESTROGEN RECEPTOR STATUS, UNSPECIFIED SITE OF BREAST (HCC): Primary | ICD-10-CM

## 2024-05-13 DIAGNOSIS — Z80.3 FAMILY HISTORY OF MALIGNANT NEOPLASM OF BREAST: ICD-10-CM

## 2024-05-13 PROCEDURE — 36415 COLL VENOUS BLD VENIPUNCTURE: CPT

## 2024-05-14 ENCOUNTER — PATIENT OUTREACH (OUTPATIENT)
Dept: HEMATOLOGY ONCOLOGY | Facility: CLINIC | Age: 64
End: 2024-05-14

## 2024-05-14 ENCOUNTER — HOSPITAL ENCOUNTER (OUTPATIENT)
Dept: RADIOLOGY | Facility: HOSPITAL | Age: 64
Discharge: HOME | End: 2024-05-14
Attending: RADIOLOGY

## 2024-05-14 DIAGNOSIS — Z76.89 REFERRAL OF PATIENT WITHOUT EXAMINATION OR TREATMENT: ICD-10-CM

## 2024-05-14 NOTE — PROGRESS NOTES
I received a call from [facility] stating they will ot release pathology slides until they have a signed medical release from patient . I reached out to patient t to inform her of this. I was able to email medical release to patient and had her fill it out and email it back to me. This has been faxed to [facility] .

## 2024-05-20 ENCOUNTER — PATIENT OUTREACH (OUTPATIENT)
Dept: HEMATOLOGY ONCOLOGY | Facility: CLINIC | Age: 64
End: 2024-05-20

## 2024-05-20 ENCOUNTER — CONSULT (OUTPATIENT)
Dept: SURGICAL ONCOLOGY | Facility: CLINIC | Age: 64
End: 2024-05-20
Payer: COMMERCIAL

## 2024-05-20 ENCOUNTER — LAB REQUISITION (OUTPATIENT)
Dept: LAB | Facility: HOSPITAL | Age: 64
End: 2024-05-20
Payer: COMMERCIAL

## 2024-05-20 VITALS
DIASTOLIC BLOOD PRESSURE: 78 MMHG | BODY MASS INDEX: 23.14 KG/M2 | RESPIRATION RATE: 14 BRPM | HEART RATE: 84 BPM | TEMPERATURE: 98.6 F | SYSTOLIC BLOOD PRESSURE: 118 MMHG | HEIGHT: 66 IN | OXYGEN SATURATION: 95 % | WEIGHT: 144 LBS

## 2024-05-20 DIAGNOSIS — D05.12 INTRADUCTAL CARCINOMA IN SITU OF LEFT BREAST: ICD-10-CM

## 2024-05-20 DIAGNOSIS — C50.912 INFILTRATING DUCTAL CARCINOMA OF LEFT BREAST (HCC): Primary | ICD-10-CM

## 2024-05-20 PROCEDURE — 99204 OFFICE O/P NEW MOD 45 MIN: CPT | Performed by: SURGERY

## 2024-05-20 PROCEDURE — 88321 CONSLTJ&REPRT SLD PREP ELSWR: CPT | Performed by: STUDENT IN AN ORGANIZED HEALTH CARE EDUCATION/TRAINING PROGRAM

## 2024-05-20 NOTE — PROGRESS NOTES
I called [facility] labs to check status on pathology slides request.I was informed breast slides went out on 5-. I did confirm with pathology department they have received slides and are currently working on outside review. Information relayed to Dr. Anguiano's team.

## 2024-05-20 NOTE — PROGRESS NOTES
Breast Consultation-Surgical Oncology     [address]  [facility]  [address]  [facility] [place] [address]    Name:  Ines Aviles  YOB: 1960  MRN:  [id number]    Assessment & Plan   Diagnoses and all orders for this visit:    Infiltrating ductal carcinoma of left breast (HCC)  -     US breast clip left; Future          HPI: Ines Aviles is a 64 y.o. year old female who presents with newly diagnosed carcinoma of the left breast.  She was asymptomatic referable to the breast.  She had an outside abnormal mammogram done in the facility in [place].  She had a biopsy with them as well.  She presents today to discuss surgical management.  She spoke with our genetics team and submitted a sample for testing last week.  Results are currently pending.    Surgical treatment to date consisted of not applicable.    Oncology History:    Oncology History   Infiltrating ductal carcinoma of left breast (HCC)   2024 -  Cancer Staged    Staging form: Breast, AJCC 8th Edition  - Clinical stage from 2024: Stage IA (cT1, cN0, cM0, G1, ER+, FL+, HER2-) - Signed by Svetlana Anguiano MD on 2024  Stage prefix: Initial diagnosis  Method of lymph node assessment: Clinical  Histologic grading system: 3 grade system       2024 Initial Diagnosis    Infiltrating ductal carcinoma of left breast (HCC)         Pertinent reproductive history:  Age at menarche:    OB History    No obstetric history on file.           Problem List:   Patient Active Problem List   Diagnosis    Infiltrating ductal carcinoma of left breast (HCC)     History reviewed. No pertinent past medical history.  Past Surgical History:   Procedure Laterality Date     SECTION       Family History   Problem Relation Age of Onset    Diabetes Mother     Hypertension Mother     No Known Problems Father     No Known Problems Brother      Social History     Socioeconomic History    Marital  status: /Civil Union     Spouse name: Not on file    Number of children: Not on file    Years of education: Not on file    Highest education level: Not on file   Occupational History    Not on file   Tobacco Use    Smoking status: Former     Current packs/day: 0.00     Average packs/day: 0.3 packs/day for 5.0 years (1.2 ttl pk-yrs)     Types: Cigarettes     Start date: 6/15/1979     Quit date: 1/15/1984     Years since quittin.3     Passive exposure: Never    Smokeless tobacco: Never   Vaping Use    Vaping status: Never Used   Substance and Sexual Activity    Alcohol use: Yes     Alcohol/week: 8.0 standard drinks of alcohol     Types: 8 Glasses of wine per week    Drug use: Never    Sexual activity: Not on file   Other Topics Concern    Not on file   Social History Narrative    Not on file     Social Determinants of Health     Financial Resource Strain: Not on file   Food Insecurity: Not on file   Transportation Needs: Not on file   Physical Activity: Not on file   Stress: Not on file   Social Connections: Not on file   Intimate Partner Violence: Not on file   Housing Stability: Not on file     No current outpatient medications on file.     No current facility-administered medications for this visit.     Allergies   Allergen Reactions    Erythromycin GI Intolerance    Levaquin [Levofloxacin] Rash    Macrobid [Nitrofurantoin] Rash         The following portions of the patient's history were reviewed and updated as appropriate: allergies, current medications, past family history, past medical history, past social history, past surgical history, and problem list.    Review of Systems:  Review of Systems   Constitutional: Negative.  Negative for appetite change, fever and unexpected weight change.   HENT: Negative.  Negative for trouble swallowing.    Eyes: Negative.    Respiratory: Negative.  Negative for cough and shortness of breath.    Cardiovascular: Negative.  Negative for chest pain.    Gastrointestinal: Negative.  Negative for abdominal pain, nausea and vomiting.   Endocrine: Negative.    Genitourinary: Negative.  Negative for dysuria.   Musculoskeletal: Negative.  Negative for arthralgias and myalgias.   Skin: Negative.    Allergic/Immunologic: Negative.    Neurological: Negative.  Negative for headaches.   Hematological: Negative.  Negative for adenopathy. Does not bruise/bleed easily.   Psychiatric/Behavioral: Negative.         Physical Exam:  Physical Exam  Constitutional:       General: She is not in acute distress.     Appearance: Normal appearance. She is well-developed.   HENT:      Head: Normocephalic and atraumatic.   Cardiovascular:      Heart sounds: Normal heart sounds.   Pulmonary:      Breath sounds: Normal breath sounds.   Chest:   Breasts:     Right: No swelling, bleeding, inverted nipple, mass, nipple discharge, skin change or tenderness.      Left: No swelling, bleeding, inverted nipple, mass, nipple discharge, skin change or tenderness.   Abdominal:      Palpations: Abdomen is soft.   Musculoskeletal:      Right lower leg: No edema.      Left lower leg: No edema.   Lymphadenopathy:      Upper Body:      Right upper body: No supraclavicular, axillary or pectoral adenopathy.      Left upper body: No supraclavicular, axillary or pectoral adenopathy.   Neurological:      Mental Status: She is alert and oriented to person, place, and time.   Psychiatric:         Mood and Affect: Mood normal.         Laboratory:  5/1/2023 outside pathology left breast 1:00 core biopsy    Pathology revealed: invasive ductal carcinoma    Histologic grade: low grade     Angiolymphatic invasion:  absent    Tumor node status: Negative    Hormone receptor status: ER/RI highly positive and HER2/marta negative    Slide review done by us concurs with the above diagnosis    Other studies: Genetic testing is pending    Imaging:  Outside imaging 4/22/2024 screening mammogram along with left diagnostic mammogram  and ultrasound shows dense breast tissue with a new small mass identified upper outer left breast with a sonographic correlate for which biopsy is recommended, area on ultrasound measures 6 mm    Postbiopsy mammogram is available for review and shows a standard clip in place    Outside image review was requested along with a Mary reflector        Discussion/Summary: 64-year-old female who presents with a left breast carcinoma.  This is unifocal based on outside imaging.  This is nonpalpable on exam.  She does have dense glandular tissue.  She has no significant family history but did submit a sample for genetic testing which is currently pending.  I discussed these findings with her including the multimodality treatment of breast cancer to include surgery, radiation and medical therapy.  If her genetic testing is negative and we have no additional concerns on the outside image review, she would be a good candidate for breast conservation.  She is hoping to proceed with breast conservation.  If she has any actionable gene mutations, she states that she would obviously consider bilateral mastectomy.  She did not commit to reconstruction or not.  She will need lymph node sampling either way.  She understands if she has breast conservation she would be referred to radiation oncology as well.  She understands that regardless of the type of surgery, she would also be referred to medical oncology to discuss adjuvant therapy to include at least hormone therapy.  All of her questions were answered today.  We will call her with the results of the genetic testing as well as the outside image review and facilitate a Mary reflector insertion.

## 2024-05-21 ENCOUNTER — TELEPHONE (OUTPATIENT)
Dept: GENETICS | Facility: CLINIC | Age: 64
End: 2024-05-21

## 2024-05-21 ENCOUNTER — PATIENT OUTREACH (OUTPATIENT)
Dept: CASE MANAGEMENT | Facility: HOSPITAL | Age: 64
End: 2024-05-21

## 2024-05-21 NOTE — TELEPHONE ENCOUNTER
STAT Genetic Test Result    Summary:    I left a message with Ines to review the result of her STAT genetic test.  I encouraged her to call our office back at [phone] to discuss this result further.      Initial Test: [facility] BRCA[facility] STAT Panel (13 genes): FREDA, BARD1, BRCA1, BRCA2, CDH1, CHEK2, NF1, PALB2, PTEN, RAD51C, RAD51D, STK11, TP53     Result: Negative - No Clinically Significant Variants Detected      Assessment:     Negative   A negative result significantly reduces the likelihood that Ines has a hereditary cancer syndrome related to the high-risk breast cancer genes listed above.      This result does not have surgical implications and surgical options should be discussed with her healthcare provider.        Additional Testing:  [facility] CustomNext: Cancer+RNAinsight (59 genes): APC, FREDA, AXIN2, BAP1, BARD1, BMPR1A, BRCA1, BRCA2, BRIP1, CDH1, CDK4, CDKN1B, CDKN2A, CHEK2, CTNNA1, DICER1, EGLN1, EPCAM, FH, FLCN, GREM1, HOXB13, KIF1B, KIT, MAX, MEN1, MET, MITF, MLH1, MSH2, MSH3, MSH6, MUTYH, NF1, NTHL1, PALB2, PDGFRA PMS2, POLD1, POLE, POT1, PTEN, RAD51C, RAD51D, RB1, RET, SDHA, SDHAF2, SDHB, SDHC, SDHD, SMAD4, SMARCA4, STK11, ACST052, TP53, TSC1, TSC2, VHL test is pending.  We will contact Ines once results are available.  Additional recommendations for surveillance/medical management will be made upon final genetic test result.         Ines's breast surgeon Dr. Anguiano was made aware of this test result.

## 2024-05-21 NOTE — PROGRESS NOTES
OSW completed chart review. Patient had consult with Dr. Anguiano, plan to be determined after results for genetics. OSW will outreach for assessment and dt

## 2024-05-22 ENCOUNTER — ANESTHESIA (OUTPATIENT)
Dept: ANESTHESIOLOGY | Facility: HOSPITAL | Age: 64
End: 2024-05-22

## 2024-05-22 ENCOUNTER — ANESTHESIA EVENT (OUTPATIENT)
Dept: ANESTHESIOLOGY | Facility: HOSPITAL | Age: 64
End: 2024-05-22

## 2024-05-23 ENCOUNTER — TELEPHONE (OUTPATIENT)
Dept: HEMATOLOGY ONCOLOGY | Facility: CLINIC | Age: 64
End: 2024-05-23

## 2024-05-23 ENCOUNTER — HOSPITAL ENCOUNTER (OUTPATIENT)
Dept: MAMMOGRAPHY | Facility: CLINIC | Age: 64
Discharge: HOME | End: 2024-05-23

## 2024-05-23 DIAGNOSIS — Z76.89 REFERRAL OF PATIENT WITHOUT EXAMINATION OR TREATMENT: ICD-10-CM

## 2024-05-23 NOTE — TELEPHONE ENCOUNTER
Spoke to patient and scheduled her for a pre-op appointment on 5/30. Message sent to [facility] to get joseph placement scheduled.

## 2024-05-23 NOTE — TELEPHONE ENCOUNTER
Patient Call    Who are you speaking with? Patient    If it is not the patient, are they listed on an active communication consent form? N/A   What is the reason for this call? Patient would like a call back to discuss some questions and then to set up surgery    Does this require a call back? Yes   If a call back is required, please list best call back number [phone]   If a call back is required, advise that a message will be forwarded to their care team and someone will return their call as soon as possible.   Did you relay this information to the patient? Yes

## 2024-05-24 NOTE — PROGRESS NOTES
Call placed to patient on _5_/__24_/_24__ regarding recommendation for    _____ RIGHT     __x____ LEFT    _____ MAMMOGRAPHY GUIDED     ___x___ ULTRASOUND GUIDED      __x___SAVI  placement.    Procedure has been explained to patient. She is aware to plan to check-in 15 minutes before her procedural start time. [facility] is located at [address] [place] [address]; 1st floor/ main entry level. She is aware there are no specific pre-procedural restrictions, lidocaine will again be utilized for pain relief during the procedure, and she is not required to fast or modify any of her normal medication routine.    __x___Verbalized understanding.      Blood thinners:  ______ Yes  __x___ No          Patient with no additional questions at this time. She does have my name & office phone number should anything come up before her scheduled procedure on _6_/_7_/__24_ , or to clarify any additional information.

## 2024-05-30 ENCOUNTER — OFFICE VISIT (OUTPATIENT)
Dept: SURGICAL ONCOLOGY | Facility: CLINIC | Age: 64
End: 2024-05-30
Payer: COMMERCIAL

## 2024-05-30 ENCOUNTER — TELEPHONE (OUTPATIENT)
Dept: SURGICAL ONCOLOGY | Facility: CLINIC | Age: 64
End: 2024-05-30

## 2024-05-30 VITALS
SYSTOLIC BLOOD PRESSURE: 120 MMHG | HEART RATE: 70 BPM | BODY MASS INDEX: 23.11 KG/M2 | WEIGHT: 143.8 LBS | HEIGHT: 66 IN | DIASTOLIC BLOOD PRESSURE: 78 MMHG | RESPIRATION RATE: 16 BRPM | TEMPERATURE: 97.6 F | OXYGEN SATURATION: 97 %

## 2024-05-30 DIAGNOSIS — Z13.71 BRCA GENE MUTATION NEGATIVE: ICD-10-CM

## 2024-05-30 DIAGNOSIS — C50.912 INFILTRATING DUCTAL CARCINOMA OF LEFT BREAST (HCC): Primary | ICD-10-CM

## 2024-05-30 PROCEDURE — 99215 OFFICE O/P EST HI 40 MIN: CPT | Performed by: SURGERY

## 2024-05-30 RX ORDER — ACETAMINOPHEN 10 MG/ML
1000 INJECTION, SOLUTION INTRAVENOUS
Status: CANCELLED | OUTPATIENT
Start: 2024-06-07 | End: 2024-06-08

## 2024-05-30 RX ORDER — TRAMADOL HYDROCHLORIDE 50 MG/1
50 TABLET ORAL EVERY 8 HOURS PRN
Qty: 9 TABLET | Refills: 0 | Status: SHIPPED | OUTPATIENT
Start: 2024-05-30

## 2024-05-30 RX ORDER — CEFAZOLIN SODIUM 1 G/50ML
1000 SOLUTION INTRAVENOUS ONCE
Status: CANCELLED | OUTPATIENT
Start: 2024-06-07 | End: 2024-05-30

## 2024-05-30 NOTE — H&P (VIEW-ONLY)
Surgical Oncology Follow Up       [address]  CANCER CARE [facility] SURGICAL ONCOLOGY [place]  [address] [facility] [address]    Ines Aviles  1960  [phone]  [address]  CANCER [facility] SURGICAL ONCOLOGY [place]  [address] [place] [address]    Chief Complaint   Patient presents with    Pre-op Exam    Breast Cancer       Assessment & Plan   Diagnoses and all orders for this visit:    Infiltrating ductal carcinoma of left breast (HCC)  -     Case request operating room: DARCIE  LOCALIZED LUMPECTOMY LEFT BREAST WITH BIOPSY LYMPH NODE SENTINEL; Standing  -     Comprehensive metabolic panel; Future  -     CBC and differential; Future  -     UA w Reflex to Microscopic w Reflex to Culture; Future  -     EKG 12 lead; Future  -     XR chest pa & lateral; Future  -     NM lymphatic breast; Future  -     traMADol (Ultram) 50 mg tablet; Take 1 tablet (50 mg total) by mouth every 8 (eight) hours as needed for moderate pain  -     Case request operating room: DARCIE  LOCALIZED LUMPECTOMY LEFT BREAST WITH BIOPSY LYMPH NODE SENTINEL    BRCA gene mutation negative    Other orders  -     Incentive spirometry; Standing  -     Insert and maintain IV line; Standing  -     Void On-Call to O.R.; Standing  -     Place sequential compression device; Standing  -     ceFAZolin (ANCEF) IVPB (premix in dextrose) 1,000 mg 50 mL  -     Reason for no Pharmacologic VTE Prophylaxis; Standing  -     acetaminophen (Ofirmev) injection 1,000 mg        Advance Care Planning/Advance Directives:  Discussed disease status, cancer treatment plans and/or cancer treatment goals with the patient.     Oncology History:    Oncology History   Infiltrating ductal carcinoma of left breast (HCC)   5/1/2024 Biopsy    Left breast ultrasound-guided biopsy ([place], [place])  1 o'clock, 4-5 cm from nipple (ribbon)  Invasive ductal carcinoma  Grade 1  , AR 95, HER2 1+, Ki-67 3  One focus suspicious for  lymphovascular invasion    Concordant. Malignancy appears unifocal; measures up to 6 mm. No left axillary adenopathy. Right breast clear.     2024 -  Cancer Staged    Staging form: Breast, AJCC 8th Edition  - Clinical stage from 2024: Stage IA (cT1, cN0, cM0, G1, ER+, CO+, HER2-) - Signed by Svetlana Anguiano MD on 2024  Stage prefix: Initial diagnosis  Method of lymph node assessment: Clinical  Histologic grading system: 3 grade system       2024 Genetic Testing    Genetic testing ordered  Ambry         History of Present Illness: Patient is here today to discuss and set up surgery  -Interval History: Genetic testing, outside image review was completed    Review of Systems:  Review of Systems   Constitutional: Negative.  Negative for appetite change, fever and unexpected weight change.   HENT: Negative.  Negative for trouble swallowing.    Eyes: Negative.    Respiratory: Negative.  Negative for cough and shortness of breath.    Cardiovascular: Negative.  Negative for chest pain.   Gastrointestinal: Negative.  Negative for abdominal pain, nausea and vomiting.   Endocrine: Negative.    Genitourinary: Negative.  Negative for dysuria.   Musculoskeletal: Negative.  Negative for arthralgias and myalgias.   Skin: Negative.    Allergic/Immunologic: Negative.    Neurological: Negative.  Negative for headaches.   Hematological: Negative.  Negative for adenopathy. Does not bruise/bleed easily.   Psychiatric/Behavioral: Negative.         Patient Active Problem List   Diagnosis    Infiltrating ductal carcinoma of left breast (HCC)    BRCA gene mutation negative     Past Medical History:   Diagnosis Date    Invasive ductal carcinoma of breast, female, left (HCC) 2024    ER, CO positive, HER-2 1+ IHC     Past Surgical History:   Procedure Laterality Date     SECTION       Family History   Problem Relation Age of Onset    Diabetes Mother     Hypertension Mother     No Known Problems Father     No Known  Problems Brother      Social History     Socioeconomic History    Marital status: /Civil Union     Spouse name: Not on file    Number of children: Not on file    Years of education: Not on file    Highest education level: Not on file   Occupational History    Not on file   Tobacco Use    Smoking status: Former     Current packs/day: 0.00     Average packs/day: 0.3 packs/day for 5.0 years (1.2 ttl pk-yrs)     Types: Cigarettes     Start date: 6/15/1979     Quit date: 1/15/1984     Years since quittin.4     Passive exposure: Never    Smokeless tobacco: Never   Vaping Use    Vaping status: Never Used   Substance and Sexual Activity    Alcohol use: Yes     Alcohol/week: 8.0 standard drinks of alcohol     Types: 8 Glasses of wine per week    Drug use: Never    Sexual activity: Not on file   Other Topics Concern    Not on file   Social History Narrative    Not on file     Social Determinants of Health     Financial Resource Strain: Not on file   Food Insecurity: Not on file   Transportation Needs: Not on file   Physical Activity: Not on file   Stress: Not on file   Social Connections: Not on file   Intimate Partner Violence: Not on file   Housing Stability: Not on file       Current Outpatient Medications:     traMADol (Ultram) 50 mg tablet, Take 1 tablet (50 mg total) by mouth every 8 (eight) hours as needed for moderate pain, Disp: 9 tablet, Rfl: 0  Allergies   Allergen Reactions    Erythromycin GI Intolerance    Levaquin [Levofloxacin] Rash    Macrobid [Nitrofurantoin] Rash       The following portions of the patient's history were reviewed and updated as appropriate: allergies, current medications, past family history, past medical history, past social history, past surgical history, and problem list.        Vitals:    24 1118   BP: 120/78   Pulse: 70   Resp: 16   Temp: 97.6 °F (36.4 °C)   SpO2: 97%       Physical Exam  Constitutional:       General: She is not in acute distress.     Appearance:  Normal appearance.   HENT:      Head: Normocephalic and atraumatic.   Cardiovascular:      Heart sounds: Normal heart sounds.   Pulmonary:      Breath sounds: Normal breath sounds.   Chest:   Breasts:     Left: No mass.   Abdominal:      Palpations: Abdomen is soft.   Musculoskeletal:      Right lower leg: No edema.      Left lower leg: No edema.   Lymphadenopathy:      Upper Body:      Left upper body: No axillary adenopathy.   Neurological:      Mental Status: She is alert and oriented to person, place, and time.   Psychiatric:         Mood and Affect: Mood normal.           Results:  Labs:  5/20/2024 lab path referral concurs with the outside diagnosis      Genetic testing is negative so far    Imaging  5/22/2024 outside image review shows unifocal carcinoma in the left breast only    I reviewed the above laboratory and imaging data.    Discussion/Summary: 64-year-old female who presents with left breast carcinoma.  Her genetic testing is so far negative.  Her outside image review was performed and she has a unifocal carcinoma only in the left breast.  I therefore counseled her on a MARY localized lumpectomy of the left breast along with lymphatic mapping and sentinel node biopsy.  She will need to have the Mary placed.  We will need to move this appointment time up for her.  She understands that she will meet with both medical and radiation oncology in the postoperative setting.  All of her questions were answered.  Consent was signed today in the office.  We are planning for surgery next Friday, 6/7/2024.

## 2024-05-30 NOTE — PROGRESS NOTES
Surgical Oncology Follow Up       [address]  CANCER CARE [facility] SURGICAL ONCOLOGY [place]  [address] [facility] [address]    Ines Aviles  1960  [phone]  [address]  CANCER [facility] SURGICAL ONCOLOGY [place]  [address] [place] [address]    Chief Complaint   Patient presents with    Pre-op Exam    Breast Cancer       Assessment & Plan   Diagnoses and all orders for this visit:    Infiltrating ductal carcinoma of left breast (HCC)  -     Case request operating room: DARCIE  LOCALIZED LUMPECTOMY LEFT BREAST WITH BIOPSY LYMPH NODE SENTINEL; Standing  -     Comprehensive metabolic panel; Future  -     CBC and differential; Future  -     UA w Reflex to Microscopic w Reflex to Culture; Future  -     EKG 12 lead; Future  -     XR chest pa & lateral; Future  -     NM lymphatic breast; Future  -     traMADol (Ultram) 50 mg tablet; Take 1 tablet (50 mg total) by mouth every 8 (eight) hours as needed for moderate pain  -     Case request operating room: DARCIE  LOCALIZED LUMPECTOMY LEFT BREAST WITH BIOPSY LYMPH NODE SENTINEL    BRCA gene mutation negative    Other orders  -     Incentive spirometry; Standing  -     Insert and maintain IV line; Standing  -     Void On-Call to O.R.; Standing  -     Place sequential compression device; Standing  -     ceFAZolin (ANCEF) IVPB (premix in dextrose) 1,000 mg 50 mL  -     Reason for no Pharmacologic VTE Prophylaxis; Standing  -     acetaminophen (Ofirmev) injection 1,000 mg        Advance Care Planning/Advance Directives:  Discussed disease status, cancer treatment plans and/or cancer treatment goals with the patient.     Oncology History:    Oncology History   Infiltrating ductal carcinoma of left breast (HCC)   5/1/2024 Biopsy    Left breast ultrasound-guided biopsy ([place], [place])  1 o'clock, 4-5 cm from nipple (ribbon)  Invasive ductal carcinoma  Grade 1  , VT 95, HER2 1+, Ki-67 3  One focus suspicious for  lymphovascular invasion    Concordant. Malignancy appears unifocal; measures up to 6 mm. No left axillary adenopathy. Right breast clear.     2024 -  Cancer Staged    Staging form: Breast, AJCC 8th Edition  - Clinical stage from 2024: Stage IA (cT1, cN0, cM0, G1, ER+, NM+, HER2-) - Signed by Svetlana Anguiano MD on 2024  Stage prefix: Initial diagnosis  Method of lymph node assessment: Clinical  Histologic grading system: 3 grade system       2024 Genetic Testing    Genetic testing ordered  Ambry         History of Present Illness: Patient is here today to discuss and set up surgery  -Interval History: Genetic testing, outside image review was completed    Review of Systems:  Review of Systems   Constitutional: Negative.  Negative for appetite change, fever and unexpected weight change.   HENT: Negative.  Negative for trouble swallowing.    Eyes: Negative.    Respiratory: Negative.  Negative for cough and shortness of breath.    Cardiovascular: Negative.  Negative for chest pain.   Gastrointestinal: Negative.  Negative for abdominal pain, nausea and vomiting.   Endocrine: Negative.    Genitourinary: Negative.  Negative for dysuria.   Musculoskeletal: Negative.  Negative for arthralgias and myalgias.   Skin: Negative.    Allergic/Immunologic: Negative.    Neurological: Negative.  Negative for headaches.   Hematological: Negative.  Negative for adenopathy. Does not bruise/bleed easily.   Psychiatric/Behavioral: Negative.         Patient Active Problem List   Diagnosis    Infiltrating ductal carcinoma of left breast (HCC)    BRCA gene mutation negative     Past Medical History:   Diagnosis Date    Invasive ductal carcinoma of breast, female, left (HCC) 2024    ER, NM positive, HER-2 1+ IHC     Past Surgical History:   Procedure Laterality Date     SECTION       Family History   Problem Relation Age of Onset    Diabetes Mother     Hypertension Mother     No Known Problems Father     No Known  Problems Brother      Social History     Socioeconomic History    Marital status: /Civil Union     Spouse name: Not on file    Number of children: Not on file    Years of education: Not on file    Highest education level: Not on file   Occupational History    Not on file   Tobacco Use    Smoking status: Former     Current packs/day: 0.00     Average packs/day: 0.3 packs/day for 5.0 years (1.2 ttl pk-yrs)     Types: Cigarettes     Start date: 6/15/1979     Quit date: 1/15/1984     Years since quittin.4     Passive exposure: Never    Smokeless tobacco: Never   Vaping Use    Vaping status: Never Used   Substance and Sexual Activity    Alcohol use: Yes     Alcohol/week: 8.0 standard drinks of alcohol     Types: 8 Glasses of wine per week    Drug use: Never    Sexual activity: Not on file   Other Topics Concern    Not on file   Social History Narrative    Not on file     Social Determinants of Health     Financial Resource Strain: Not on file   Food Insecurity: Not on file   Transportation Needs: Not on file   Physical Activity: Not on file   Stress: Not on file   Social Connections: Not on file   Intimate Partner Violence: Not on file   Housing Stability: Not on file       Current Outpatient Medications:     traMADol (Ultram) 50 mg tablet, Take 1 tablet (50 mg total) by mouth every 8 (eight) hours as needed for moderate pain, Disp: 9 tablet, Rfl: 0  Allergies   Allergen Reactions    Erythromycin GI Intolerance    Levaquin [Levofloxacin] Rash    Macrobid [Nitrofurantoin] Rash       The following portions of the patient's history were reviewed and updated as appropriate: allergies, current medications, past family history, past medical history, past social history, past surgical history, and problem list.        Vitals:    24 1118   BP: 120/78   Pulse: 70   Resp: 16   Temp: 97.6 °F (36.4 °C)   SpO2: 97%       Physical Exam  Constitutional:       General: She is not in acute distress.     Appearance:  Normal appearance.   HENT:      Head: Normocephalic and atraumatic.   Cardiovascular:      Heart sounds: Normal heart sounds.   Pulmonary:      Breath sounds: Normal breath sounds.   Chest:   Breasts:     Left: No mass.   Abdominal:      Palpations: Abdomen is soft.   Musculoskeletal:      Right lower leg: No edema.      Left lower leg: No edema.   Lymphadenopathy:      Upper Body:      Left upper body: No axillary adenopathy.   Neurological:      Mental Status: She is alert and oriented to person, place, and time.   Psychiatric:         Mood and Affect: Mood normal.           Results:  Labs:  5/20/2024 lab path referral concurs with the outside diagnosis      Genetic testing is negative so far    Imaging  5/22/2024 outside image review shows unifocal carcinoma in the left breast only    I reviewed the above laboratory and imaging data.    Discussion/Summary: 64-year-old female who presents with left breast carcinoma.  Her genetic testing is so far negative.  Her outside image review was performed and she has a unifocal carcinoma only in the left breast.  I therefore counseled her on a MARY localized lumpectomy of the left breast along with lymphatic mapping and sentinel node biopsy.  She will need to have the Mary placed.  We will need to move this appointment time up for her.  She understands that she will meet with both medical and radiation oncology in the postoperative setting.  All of her questions were answered.  Consent was signed today in the office.  We are planning for surgery next Friday, 6/7/2024.

## 2024-05-30 NOTE — TELEPHONE ENCOUNTER
Called and spoke with patient and she has been booked for her post op with Dr Anguiano on 6/20/24 at 9 am in the [place] office. Patient is in agreement.

## 2024-05-31 ENCOUNTER — HOSPITAL ENCOUNTER (OUTPATIENT)
Dept: RADIOLOGY | Facility: HOSPITAL | Age: 64
Discharge: HOME | End: 2024-05-31
Payer: COMMERCIAL

## 2024-05-31 ENCOUNTER — APPOINTMENT (OUTPATIENT)
Dept: LAB | Facility: HOSPITAL | Age: 64
End: 2024-05-31
Payer: COMMERCIAL

## 2024-05-31 DIAGNOSIS — Z13.6 SCREENING FOR CARDIOVASCULAR CONDITION: ICD-10-CM

## 2024-05-31 DIAGNOSIS — C50.912 INFILTRATING DUCTAL CARCINOMA OF LEFT BREAST (HCC): ICD-10-CM

## 2024-05-31 LAB
ALBUMIN SERPL BCP-MCNC: 4.3 G/DL (ref 3.5–5)
ALP SERPL-CCNC: 77 U/L (ref 34–104)
ALT SERPL W P-5'-P-CCNC: 13 U/L (ref 7–52)
ANION GAP SERPL CALCULATED.3IONS-SCNC: 6 MMOL/L (ref 4–13)
AST SERPL W P-5'-P-CCNC: 18 U/L (ref 13–39)
ATRIAL RATE: 60 BPM
BACTERIA UR QL AUTO: NORMAL /HPF
BASOPHILS # BLD AUTO: 0.05 THOUSANDS/ÂΜL (ref 0–0.1)
BASOPHILS NFR BLD AUTO: 1 % (ref 0–1)
BILIRUB SERPL-MCNC: 0.85 MG/DL (ref 0.2–1)
BILIRUB UR QL STRIP: NEGATIVE
BUN SERPL-MCNC: 14 MG/DL (ref 5–25)
CALCIUM SERPL-MCNC: 9.2 MG/DL (ref 8.4–10.2)
CHLORIDE SERPL-SCNC: 105 MMOL/L (ref 96–108)
CHOLEST SERPL-MCNC: 213 MG/DL
CLARITY UR: CLEAR
CO2 SERPL-SCNC: 26 MMOL/L (ref 21–32)
COLOR UR: COLORLESS
CREAT SERPL-MCNC: 0.77 MG/DL (ref 0.6–1.3)
EOSINOPHIL # BLD AUTO: 0.15 THOUSAND/ÂΜL (ref 0–0.61)
EOSINOPHIL NFR BLD AUTO: 2 % (ref 0–6)
ERYTHROCYTE [DISTWIDTH] IN BLOOD BY AUTOMATED COUNT: 12.4 % (ref 11.6–15.1)
GFR SERPL CREATININE-BSD FRML MDRD: 82 ML/MIN/1.73SQ M
GLUCOSE P FAST SERPL-MCNC: 92 MG/DL (ref 65–99)
GLUCOSE UR STRIP-MCNC: NEGATIVE MG/DL
HCT VFR BLD AUTO: 44.7 % (ref 34.8–46.1)
HDLC SERPL-MCNC: 103 MG/DL
HGB BLD-MCNC: 14.6 G/DL (ref 11.5–15.4)
HGB UR QL STRIP.AUTO: ABNORMAL
IMM GRANULOCYTES # BLD AUTO: 0.02 THOUSAND/UL (ref 0–0.2)
IMM GRANULOCYTES NFR BLD AUTO: 0 % (ref 0–2)
KETONES UR STRIP-MCNC: NEGATIVE MG/DL
LDLC SERPL CALC-MCNC: 98 MG/DL (ref 0–100)
LEUKOCYTE ESTERASE UR QL STRIP: ABNORMAL
LYMPHOCYTES # BLD AUTO: 2.71 THOUSANDS/ÂΜL (ref 0.6–4.47)
LYMPHOCYTES NFR BLD AUTO: 40 % (ref 14–44)
MCH RBC QN AUTO: 31.1 PG (ref 26.8–34.3)
MCHC RBC AUTO-ENTMCNC: 32.7 G/DL (ref 31.4–37.4)
MCV RBC AUTO: 95 FL (ref 82–98)
MONOCYTES # BLD AUTO: 0.52 THOUSAND/ÂΜL (ref 0.17–1.22)
MONOCYTES NFR BLD AUTO: 8 % (ref 4–12)
NEUTROPHILS # BLD AUTO: 3.38 THOUSANDS/ÂΜL (ref 1.85–7.62)
NEUTS SEG NFR BLD AUTO: 49 % (ref 43–75)
NITRITE UR QL STRIP: NEGATIVE
NON-SQ EPI CELLS URNS QL MICRO: NORMAL /HPF
NONHDLC SERPL-MCNC: 110 MG/DL
NRBC BLD AUTO-RTO: 0 /100 WBCS
P AXIS: 29 DEGREES
PH UR STRIP.AUTO: 6.5 [PH]
PLATELET # BLD AUTO: 277 THOUSANDS/UL (ref 149–390)
PMV BLD AUTO: 10 FL (ref 8.9–12.7)
POTASSIUM SERPL-SCNC: 3.8 MMOL/L (ref 3.5–5.3)
PR INTERVAL: 170 MS
PROT SERPL-MCNC: 7.1 G/DL (ref 6.4–8.4)
PROT UR STRIP-MCNC: NEGATIVE MG/DL
QRS AXIS: 16 DEGREES
QRSD INTERVAL: 88 MS
QT INTERVAL: 414 MS
QTC INTERVAL: 414 MS
RBC # BLD AUTO: 4.69 MILLION/UL (ref 3.81–5.12)
RBC #/AREA URNS AUTO: NORMAL /HPF
SODIUM SERPL-SCNC: 137 MMOL/L (ref 135–147)
SP GR UR STRIP.AUTO: <1.005 (ref 1–1.03)
T WAVE AXIS: 40 DEGREES
TRIGL SERPL-MCNC: 61 MG/DL
UROBILINOGEN UR STRIP-ACNC: <2 MG/DL
VENTRICULAR RATE: 60 BPM
WBC # BLD AUTO: 6.83 THOUSAND/UL (ref 4.31–10.16)
WBC #/AREA URNS AUTO: NORMAL /HPF

## 2024-05-31 PROCEDURE — 85025 COMPLETE CBC W/AUTO DIFF WBC: CPT

## 2024-05-31 PROCEDURE — 80053 COMPREHEN METABOLIC PANEL: CPT

## 2024-05-31 PROCEDURE — 36415 COLL VENOUS BLD VENIPUNCTURE: CPT

## 2024-05-31 PROCEDURE — 93010 ELECTROCARDIOGRAM REPORT: CPT | Performed by: INTERNAL MEDICINE

## 2024-05-31 PROCEDURE — 80061 LIPID PANEL: CPT

## 2024-05-31 PROCEDURE — 71046 X-RAY EXAM CHEST 2 VIEWS: CPT

## 2024-05-31 PROCEDURE — 81001 URINALYSIS AUTO W/SCOPE: CPT

## 2024-06-03 NOTE — PRE-PROCEDURE INSTRUCTIONS
Medication instructions for day surgery reviewed. Please use only a sip of water to take your instructed medications. Avoid all over the counter vitamins, supplements and NSAIDS for one week prior to surgery per anesthesia guidelines. Tylenol is ok to take as needed.     You will receive a call one business day prior to surgery with an arrival time and hospital directions. If your surgery is scheduled on a Monday, the hospital will be calling you on the Friday prior to your surgery. If you have not heard from anyone by 8pm, please call the hospital supervisor through the hospital  at [phone]. ([place] [phone] or [place] [phone]).    Do not eat or drink anything after midnight the night before your surgery, including candy, mints, lifesavers, or chewing gum. Do not drink alcohol 24hrs before your surgery. Try not to smoke at least 24hrs before your surgery.       Follow the pre surgery showering instructions as listed in the “My Surgical Experience Booklet” or otherwise provided by your surgeon's office. Do not use a blade to shave the surgical area 1 week before surgery. It is okay to use a clean electric clippers up to 24 hours before surgery. Do not apply any lotions, creams, including makeup, cologne, deodorant, or perfumes after showering on the day of your surgery. Do not use dry shampoo, hair spray, hair gel, or any type of hair products.     No contact lenses, eye make-up, or artificial eyelashes. Remove nail polish, including gel polish, and any artificial, gel, or acrylic nails if possible. Remove all jewelry including rings and body piercing jewelry.     Wear causal clothing that is easy to take on and off. Consider your type of surgery.    Keep any valuables, jewelry, piercings at home. Please bring any specially ordered equipment (sling, braces) if indicated.    Arrange for a responsible person to drive you to and from the hospital on the day of your surgery. Please confirm the  visitor policy for the day of your procedure when you receive your phone call with an arrival time.     Call the surgeon's office with any new illnesses, exposures, or additional questions prior to surgery.    Please reference your “My Surgical Experience Booklet” for additional information to prepare for your upcoming surgery.

## 2024-06-04 ENCOUNTER — HOSPITAL ENCOUNTER (OUTPATIENT)
Dept: ULTRASOUND IMAGING | Facility: CLINIC | Age: 64
Discharge: HOME | End: 2024-06-04
Payer: COMMERCIAL

## 2024-06-04 ENCOUNTER — HOSPITAL ENCOUNTER (OUTPATIENT)
Dept: MAMMOGRAPHY | Facility: CLINIC | Age: 64
Discharge: HOME | End: 2024-06-04
Payer: COMMERCIAL

## 2024-06-04 VITALS
BODY MASS INDEX: 23.82 KG/M2 | SYSTOLIC BLOOD PRESSURE: 129 MMHG | HEIGHT: 65 IN | WEIGHT: 143 LBS | HEART RATE: 68 BPM | DIASTOLIC BLOOD PRESSURE: 81 MMHG

## 2024-06-04 DIAGNOSIS — C50.912 INFILTRATING DUCTAL CARCINOMA OF LEFT BREAST (HCC): ICD-10-CM

## 2024-06-04 PROCEDURE — 19285 PERQ DEV BREAST 1ST US IMAG: CPT

## 2024-06-04 RX ORDER — LIDOCAINE HYDROCHLORIDE 10 MG/ML
5 INJECTION, SOLUTION EPIDURAL; INFILTRATION; INTRACAUDAL; PERINEURAL ONCE
Status: COMPLETED | OUTPATIENT
Start: 2024-06-04 | End: 2024-06-04

## 2024-06-04 RX ADMIN — LIDOCAINE HYDROCHLORIDE 5 ML: 10 INJECTION, SOLUTION EPIDURAL; INFILTRATION; INTRACAUDAL; PERINEURAL at 10:14

## 2024-06-04 NOTE — PROGRESS NOTES
Procedure type: Mary Placement     __x___ultrasound guided _____stereotactic    Breast:    __x___Left _____Right    Location: 1 o'clock 4 cmfn    Needle: 16g      # of passes: 1    Clip: Mary  Reflector    Performed by: Dr. Crocker     Pressure held for 5 minutes by: Lori Greene Strips:    _____yes ___X__no    Band aid:    __X___yes_____no    Tolerated procedure:    __X___yes _____no

## 2024-06-05 ENCOUNTER — PATIENT OUTREACH (OUTPATIENT)
Dept: CASE MANAGEMENT | Facility: HOSPITAL | Age: 64
End: 2024-06-05

## 2024-06-05 NOTE — PROGRESS NOTES
Biopsychosocial and Barriers Assessment    Cancer Diagnosis: Breast   Home/Cell Phone: [phone]  Emergency Contact: Michel (spouse)  Marital Status:    Interpretation concerns, speaks another language, preferred language: English  Cultural concerns: none  Ability to read or write:  yes    Caregiver/Support: Patient reported that she has a great group of friends   Children: Patient has 3 grown children, 1 lives in [place], 1 in [place] and 1 is currently home until beginning of July and will then be moving to [facility].   Child/Elder care: patient is currently caring for her parents who are currently placed in a STR until a plan can be made for their care    Housing: cond  Home Setup:  more than 1 level however no difficulty navigating the home   Lives With: spouse and currently her youngest son until he moved to [facility]   Daily Living Activities: independent   Durable Medical Equipment: none  Ambulation: independent     Preferred Pharmacy: [facility]   High co-pays with insurance: no   High co-pays with medication coverage: no  No medication coverage[facility]     Primary Care Provider: Dr. Isaacs  Hx of Home Health Care:no  Hx of Short term rehab: no  Mental Health Hx: none  Substance Abuse Hx: none  Employment: works full time and employer has been very supportive  [place] Status/Location: no  Ability to pay bills: yes  POA/LW/AD: would like information mailed to her  Transportation Plan/Concerns: no concerns    What do you know about your Cancer Diagnosis    What has your doctor told you about your cancer diagnosis: breast    What has your doctor told you about your cancer treatment: patient will have lumpectomy on 6/7/24    What specific concerns do you have about your diagnosis and treatment:  Patient denies current concerns stating that everything has been explained to her well and there has been great communication.   Have you been made aware of any hair loss associated with treatment: did not  address    Additional Comments:  OSW outreached to complete assessment and dt. Patient self rated DT as 2/10. Patient denies physical, emotional, social, spiritual concerns. Patient reported that she does have her parents that are currently in a STR that she cares for. They are trying to figure out long term plan for them.     Patient denies current needs but was encouraged to reach out to OSW should a need arise.

## 2024-06-05 NOTE — PROGRESS NOTES
Post procedure call completed    Bleeding: _____yes __X___no    Pain: _____yes ___X___no    Redness/Swelling: ______yes ___X___no    Band aid removed: _____yes ___X__no (discussed removing when she showers)

## 2024-06-06 ENCOUNTER — ANESTHESIA EVENT (OUTPATIENT)
Dept: PERIOP | Facility: HOSPITAL | Age: 64
End: 2024-06-06
Payer: COMMERCIAL

## 2024-06-07 ENCOUNTER — HOSPITAL ENCOUNTER (OUTPATIENT)
Dept: MAMMOGRAPHY | Facility: HOSPITAL | Age: 64
Discharge: HOME | End: 2024-06-07
Payer: COMMERCIAL

## 2024-06-07 ENCOUNTER — ANESTHESIA (OUTPATIENT)
Dept: PERIOP | Facility: HOSPITAL | Age: 64
End: 2024-06-07
Payer: COMMERCIAL

## 2024-06-07 ENCOUNTER — TELEPHONE (OUTPATIENT)
Dept: GENETICS | Facility: CLINIC | Age: 64
End: 2024-06-07

## 2024-06-07 ENCOUNTER — HOSPITAL ENCOUNTER (OUTPATIENT)
Facility: HOSPITAL | Age: 64
Setting detail: OUTPATIENT SURGERY
Discharge: HOME | End: 2024-06-07
Attending: SURGERY | Admitting: SURGERY
Payer: COMMERCIAL

## 2024-06-07 ENCOUNTER — HOSPITAL ENCOUNTER (OUTPATIENT)
Dept: NUCLEAR MEDICINE | Facility: HOSPITAL | Age: 64
Discharge: HOME | End: 2024-06-07
Attending: SURGERY
Payer: COMMERCIAL

## 2024-06-07 ENCOUNTER — HOSPITAL ENCOUNTER (OUTPATIENT)
Dept: RADIOLOGY | Facility: HOSPITAL | Age: 64
Discharge: HOME | End: 2024-06-07

## 2024-06-07 VITALS
BODY MASS INDEX: 23.4 KG/M2 | DIASTOLIC BLOOD PRESSURE: 58 MMHG | SYSTOLIC BLOOD PRESSURE: 109 MMHG | RESPIRATION RATE: 16 BRPM | TEMPERATURE: 97.6 F | HEIGHT: 65 IN | WEIGHT: 140.43 LBS | HEART RATE: 77 BPM | OXYGEN SATURATION: 95 %

## 2024-06-07 DIAGNOSIS — C50.912 INFILTRATING DUCTAL CARCINOMA OF LEFT BREAST (HCC): ICD-10-CM

## 2024-06-07 PROBLEM — C50.412 MALIGNANT NEOPLASM OF UPPER-OUTER QUADRANT OF LEFT BREAST IN FEMALE, ESTROGEN RECEPTOR POSITIVE (HCC): Status: ACTIVE | Noted: 2024-05-21

## 2024-06-07 PROBLEM — Z17.0 MALIGNANT NEOPLASM OF UPPER-OUTER QUADRANT OF LEFT BREAST IN FEMALE, ESTROGEN RECEPTOR POSITIVE (HCC): Status: ACTIVE | Noted: 2024-05-21

## 2024-06-07 PROCEDURE — 88307 TISSUE EXAM BY PATHOLOGIST: CPT | Performed by: PATHOLOGY

## 2024-06-07 PROCEDURE — 38792 RA TRACER ID OF SENTINL NODE: CPT

## 2024-06-07 PROCEDURE — 38900 IO MAP OF SENT LYMPH NODE: CPT | Performed by: SURGERY

## 2024-06-07 PROCEDURE — A9541 TC99M SULFUR COLLOID: HCPCS

## 2024-06-07 PROCEDURE — 38525 BIOPSY/REMOVAL LYMPH NODES: CPT | Performed by: PHYSICIAN ASSISTANT

## 2024-06-07 PROCEDURE — 19301 PARTIAL MASTECTOMY: CPT | Performed by: PHYSICIAN ASSISTANT

## 2024-06-07 PROCEDURE — 38525 BIOPSY/REMOVAL LYMPH NODES: CPT | Performed by: SURGERY

## 2024-06-07 PROCEDURE — 76098 X-RAY EXAM SURGICAL SPECIMEN: CPT | Performed by: SURGERY

## 2024-06-07 PROCEDURE — 19301 PARTIAL MASTECTOMY: CPT | Performed by: SURGERY

## 2024-06-07 PROCEDURE — 38900 IO MAP OF SENT LYMPH NODE: CPT | Performed by: PHYSICIAN ASSISTANT

## 2024-06-07 RX ORDER — ACETAMINOPHEN 10 MG/ML
1000 INJECTION, SOLUTION INTRAVENOUS
Status: COMPLETED | OUTPATIENT
Start: 2024-06-07 | End: 2024-06-07

## 2024-06-07 RX ORDER — FENTANYL CITRATE/PF 50 MCG/ML
25 SYRINGE (ML) INJECTION
Status: DISCONTINUED | OUTPATIENT
Start: 2024-06-07 | End: 2024-06-07 | Stop reason: HOSPADM

## 2024-06-07 RX ORDER — KETOROLAC TROMETHAMINE 30 MG/ML
INJECTION, SOLUTION INTRAMUSCULAR; INTRAVENOUS AS NEEDED
Status: DISCONTINUED | OUTPATIENT
Start: 2024-06-07 | End: 2024-06-07

## 2024-06-07 RX ORDER — FENTANYL CITRATE 50 UG/ML
INJECTION, SOLUTION INTRAMUSCULAR; INTRAVENOUS AS NEEDED
Status: DISCONTINUED | OUTPATIENT
Start: 2024-06-07 | End: 2024-06-07

## 2024-06-07 RX ORDER — EPHEDRINE SULFATE 50 MG/ML
INJECTION INTRAVENOUS AS NEEDED
Status: DISCONTINUED | OUTPATIENT
Start: 2024-06-07 | End: 2024-06-07

## 2024-06-07 RX ORDER — CEFAZOLIN SODIUM 1 G/50ML
1000 SOLUTION INTRAVENOUS ONCE
Status: COMPLETED | OUTPATIENT
Start: 2024-06-07 | End: 2024-06-07

## 2024-06-07 RX ORDER — MAGNESIUM HYDROXIDE 1200 MG/15ML
LIQUID ORAL AS NEEDED
Status: DISCONTINUED | OUTPATIENT
Start: 2024-06-07 | End: 2024-06-07 | Stop reason: HOSPADM

## 2024-06-07 RX ORDER — PHENYLEPHRINE HCL IN 0.9% NACL 1 MG/10 ML
SYRINGE (ML) INTRAVENOUS AS NEEDED
Status: DISCONTINUED | OUTPATIENT
Start: 2024-06-07 | End: 2024-06-07

## 2024-06-07 RX ORDER — SODIUM CHLORIDE 9 MG/ML
125 INJECTION, SOLUTION INTRAVENOUS CONTINUOUS
Status: DISCONTINUED | OUTPATIENT
Start: 2024-06-07 | End: 2024-06-07 | Stop reason: HOSPADM

## 2024-06-07 RX ORDER — ISOSULFAN BLUE 50 MG/5ML
INJECTION, SOLUTION SUBCUTANEOUS AS NEEDED
Status: DISCONTINUED | OUTPATIENT
Start: 2024-06-07 | End: 2024-06-07 | Stop reason: HOSPADM

## 2024-06-07 RX ORDER — PROPOFOL 10 MG/ML
INJECTION, EMULSION INTRAVENOUS AS NEEDED
Status: DISCONTINUED | OUTPATIENT
Start: 2024-06-07 | End: 2024-06-07

## 2024-06-07 RX ORDER — ONDANSETRON 2 MG/ML
4 INJECTION INTRAMUSCULAR; INTRAVENOUS ONCE AS NEEDED
Status: DISCONTINUED | OUTPATIENT
Start: 2024-06-07 | End: 2024-06-07 | Stop reason: HOSPADM

## 2024-06-07 RX ORDER — ACETAMINOPHEN 325 MG/1
650 TABLET ORAL EVERY 6 HOURS PRN
Status: DISCONTINUED | OUTPATIENT
Start: 2024-06-07 | End: 2024-06-07 | Stop reason: HOSPADM

## 2024-06-07 RX ORDER — MIDAZOLAM HYDROCHLORIDE 2 MG/2ML
INJECTION, SOLUTION INTRAMUSCULAR; INTRAVENOUS AS NEEDED
Status: DISCONTINUED | OUTPATIENT
Start: 2024-06-07 | End: 2024-06-07

## 2024-06-07 RX ORDER — DEXAMETHASONE SODIUM PHOSPHATE 10 MG/ML
INJECTION, SOLUTION INTRAMUSCULAR; INTRAVENOUS AS NEEDED
Status: DISCONTINUED | OUTPATIENT
Start: 2024-06-07 | End: 2024-06-07

## 2024-06-07 RX ORDER — ONDANSETRON 2 MG/ML
INJECTION INTRAMUSCULAR; INTRAVENOUS AS NEEDED
Status: DISCONTINUED | OUTPATIENT
Start: 2024-06-07 | End: 2024-06-07

## 2024-06-07 RX ORDER — LIDOCAINE HYDROCHLORIDE 10 MG/ML
INJECTION, SOLUTION EPIDURAL; INFILTRATION; INTRACAUDAL; PERINEURAL AS NEEDED
Status: DISCONTINUED | OUTPATIENT
Start: 2024-06-07 | End: 2024-06-07

## 2024-06-07 RX ORDER — BUPIVACAINE HYDROCHLORIDE 5 MG/ML
INJECTION, SOLUTION EPIDURAL; INTRACAUDAL AS NEEDED
Status: DISCONTINUED | OUTPATIENT
Start: 2024-06-07 | End: 2024-06-07 | Stop reason: HOSPADM

## 2024-06-07 RX ORDER — SODIUM CHLORIDE, SODIUM LACTATE, POTASSIUM CHLORIDE, CALCIUM CHLORIDE 600; 310; 30; 20 MG/100ML; MG/100ML; MG/100ML; MG/100ML
INJECTION, SOLUTION INTRAVENOUS CONTINUOUS PRN
Status: DISCONTINUED | OUTPATIENT
Start: 2024-06-07 | End: 2024-06-07

## 2024-06-07 RX ADMIN — MIDAZOLAM 2 MG: 1 INJECTION INTRAMUSCULAR; INTRAVENOUS at 11:29

## 2024-06-07 RX ADMIN — EPHEDRINE SULFATE 5 MG: 50 INJECTION, SOLUTION INTRAVENOUS at 12:16

## 2024-06-07 RX ADMIN — EPHEDRINE SULFATE 5 MG: 50 INJECTION, SOLUTION INTRAVENOUS at 12:47

## 2024-06-07 RX ADMIN — FENTANYL CITRATE 25 MCG: 50 INJECTION INTRAMUSCULAR; INTRAVENOUS at 11:37

## 2024-06-07 RX ADMIN — KETOROLAC TROMETHAMINE 15 MG: 30 INJECTION, SOLUTION INTRAMUSCULAR; INTRAVENOUS at 13:24

## 2024-06-07 RX ADMIN — ONDANSETRON 4 MG: 2 INJECTION INTRAMUSCULAR; INTRAVENOUS at 12:42

## 2024-06-07 RX ADMIN — Medication 100 MCG: at 12:18

## 2024-06-07 RX ADMIN — Medication 50 MCG: at 12:47

## 2024-06-07 RX ADMIN — SODIUM CHLORIDE 125 ML/HR: 0.9 INJECTION, SOLUTION INTRAVENOUS at 09:56

## 2024-06-07 RX ADMIN — PROPOFOL 150 MG: 10 INJECTION, EMULSION INTRAVENOUS at 11:37

## 2024-06-07 RX ADMIN — EPHEDRINE SULFATE 5 MG: 50 INJECTION, SOLUTION INTRAVENOUS at 11:41

## 2024-06-07 RX ADMIN — EPHEDRINE SULFATE 5 MG: 50 INJECTION, SOLUTION INTRAVENOUS at 11:50

## 2024-06-07 RX ADMIN — FENTANYL CITRATE 25 MCG: 50 INJECTION INTRAMUSCULAR; INTRAVENOUS at 12:32

## 2024-06-07 RX ADMIN — Medication 50 MCG: at 12:25

## 2024-06-07 RX ADMIN — EPHEDRINE SULFATE 10 MG: 50 INJECTION, SOLUTION INTRAVENOUS at 12:25

## 2024-06-07 RX ADMIN — LIDOCAINE HYDROCHLORIDE 100 MG: 10 INJECTION, SOLUTION EPIDURAL; INFILTRATION; INTRACAUDAL; PERINEURAL at 11:37

## 2024-06-07 RX ADMIN — SODIUM CHLORIDE, SODIUM LACTATE, POTASSIUM CHLORIDE, AND CALCIUM CHLORIDE: .6; .31; .03; .02 INJECTION, SOLUTION INTRAVENOUS at 12:33

## 2024-06-07 RX ADMIN — PROPOFOL 50 MG: 10 INJECTION, EMULSION INTRAVENOUS at 11:38

## 2024-06-07 RX ADMIN — DEXAMETHASONE SODIUM PHOSPHATE 10 MG: 10 INJECTION INTRAMUSCULAR; INTRAVENOUS at 11:41

## 2024-06-07 RX ADMIN — EPHEDRINE SULFATE 10 MG: 50 INJECTION, SOLUTION INTRAVENOUS at 11:52

## 2024-06-07 RX ADMIN — SODIUM CHLORIDE: 0.9 INJECTION, SOLUTION INTRAVENOUS at 12:32

## 2024-06-07 RX ADMIN — FENTANYL CITRATE 50 MCG: 50 INJECTION INTRAMUSCULAR; INTRAVENOUS at 12:10

## 2024-06-07 RX ADMIN — CEFAZOLIN SODIUM 1000 MG: 1 SOLUTION INTRAVENOUS at 11:28

## 2024-06-07 RX ADMIN — ACETAMINOPHEN 1000 MG: 10 INJECTION INTRAVENOUS at 11:39

## 2024-06-07 NOTE — ANESTHESIA POSTPROCEDURE EVALUATION
Post-Op Assessment Note    CV Status:  Stable    Pain management: adequate       Mental Status:  Awake and alert   Hydration Status:  Stable   PONV Controlled:  Controlled   Airway Patency:  Patent     Post Op Vitals Reviewed: Yes    No anethesia notable event occurred.    Staff: NYDIA               /63 (06/07/24 1330)    Temp 98.3 °F (36.8 °C) (06/07/24 1330)    Pulse 90 (06/07/24 1330)   Resp 22 (06/07/24 1330)    SpO2

## 2024-06-07 NOTE — ANESTHESIA PREPROCEDURE EVALUATION
Procedure:  DARCIE  LOCALIZED LUMPECTOMY LEFT BREAST W/SLN BX, LYMPHOSCINTIGRAPHY,LYMPHATIC MAPPING (Left: Breast)    Relevant Problems   GYN   (+) Infiltrating ductal carcinoma of left breast (HCC)   (+) Malignant neoplasm of upper-outer quadrant of left breast in female, estrogen receptor positive (HCC)      Other   (+) BRCA gene mutation negative      Lab Results   Component Value Date    SODIUM 137 05/31/2024    K 3.8 05/31/2024     05/31/2024    CO2 26 05/31/2024    AGAP 6 05/31/2024    BUN 14 05/31/2024    CREATININE 0.77 05/31/2024    GLUC 94 02/26/2021    GLUF 92 05/31/2024    CALCIUM 9.2 05/31/2024    AST 18 05/31/2024    ALT 13 05/31/2024    ALKPHOS 77 05/31/2024    TP 7.1 05/31/2024    TBILI 0.85 05/31/2024    EGFR 82 05/31/2024     Lab Results   Component Value Date    WBC 6.83 05/31/2024    HGB 14.6 05/31/2024    HCT 44.7 05/31/2024    MCV 95 05/31/2024     05/31/2024         Physical Exam    Airway    Mallampati score: I  TM Distance: >3 FB  Neck ROM: full     Dental   No notable dental hx     Cardiovascular  Rhythm: regular, Rate: normal, Cardiovascular exam normal    Pulmonary  Pulmonary exam normal Breath sounds clear to auscultation    Other Findings  post-pubertal.      Anesthesia Plan  ASA Score- 1     Anesthesia Type- general with ASA Monitors.         Additional Monitors:     Airway Plan: LMA.           Plan Factors-Exercise tolerance (METS): >4 METS.    Chart reviewed. EKG reviewed. Imaging results reviewed. Existing labs reviewed. Patient summary reviewed.                  Induction- intravenous.    Postoperative Plan-         Informed Consent- Anesthetic plan and risks discussed with patient.

## 2024-06-07 NOTE — INTERVAL H&P NOTE
H&P reviewed. After examining the patient I find no changes in the patients condition since the H&P had been written.    Vitals:    06/07/24 0941   BP: 122/66   Pulse: 81   Resp: 16   Temp: 99.1 °F (37.3 °C)   SpO2: 96%

## 2024-06-07 NOTE — DISCHARGE INSTR - AVS FIRST PAGE
POST-OPERATIVE CARE INSTRUCTIONS       Care after your procedure:   General  Rest and relax for 24 hours, then gradually return to normal activities.  Do not preform any heavy lifting or strenuous physical activities for 14 days.  Your activity restrictions will be re-evaluated at your post op visit.  Drink clear liquids until you are certain there is no nausea, then resume a normal diet.  Do not drink alcohol, drive any vehicle, operate mechanical equipment or make critical decisions for at least 24 hours and until you are off any narcotic pain medications.  The Incision  Your incision is closed with:   dissolvable stiches just underneath the skin.               The incision is also covered with:                         clear waterproof glue  A gauze-pad is covering the wound.  Wound care  Remove your gauze-pad after 24 hours.   You may then shower using soap and water to clean your incision. Gently dry the wound.  You may redress your wound with additional gauze and tape if you choose.  A little bruising at the wound site is normal.    Medication  Resume all previous medications  Take either Naproxen (Aleve) one tablet every 8 hours or Ibuprofen(Advil/Motrin) one(1) to two(2) tablets every 6 hours around the clock for the first 2-3 days.       Take this even if you don't think you need it for at least the first 24 hours.  Pain Medication Instructions:may also use over the counter tylenol or prescription tramadol if needed          Other (If applicable)  Wear a post-surgical bra around the clock.  May use ice to the incision site(s) for the next 24-48 hours, twice daily.   Call your  doctor if you have any of the following:  Redness, swelling, heat, drainage, and/or bleeding from your wound  Chills or fever ( above 101' F )  Pain, not relieved with the above medications  If you have any questions or problems call our office [phone]    Follow-up appointment:  As scheduled

## 2024-06-07 NOTE — TELEPHONE ENCOUNTER
Genetic Test Result Note    Summary:    Result Disclosure:   Today I left a voicemail for Ines reviewing the results of her genetic test for hereditary cancer. She underwent genetic testing through our program on 5/10/2024 due to her recent diagnosis of breast cancer. I encouraged her to call [phone] to discuss this result further.  A copy of this consult note and test result will be shared with the patient.    A separate report of her STAT genetic test results were disclosed to her on 5/21/2024. This result includes new genes and does not change her initial genetic test result.     Test Performed: [facility] BRCAplus STAT Panel (13 genes): FREDA, BARD1, BRCA1, BRCA2, CDH1, CHEK2, NF1, PALB2, PTEN, RAD51C, RAD51D, STK11, TP53 with reflex to [facility] CustomNext: Cancer+RNAinsight (59 genes): APC, FREDA, AXIN2, BAP1, BARD1, BMPR1A, BRCA1, BRCA2, BRIP1, CDH1, CDK4, CDKN1B, CDKN2A, CHEK2, CTNNA1, DICER1, EGLN1, EPCAM, FH, FLCN, GREM1, HOXB13, KIF1B, KIT, MAX, MEN1, MET, MITF, MLH1, MSH2, MSH3, MSH6, MUTYH, NF1, NTHL1, PALB2, PDGFRA PMS2, POLD1, POLE, POT1, PTEN, RAD51C, RAD51D, RB1, RET, SDHA, SDHAF2, SDHB, SDHC, SDHD, SMAD4, SMARCA4, STK11, CCNA639, TP53, TSC1, TSC2, VHL     Result: Negative - No Clinically Significant Variants Detected     Assessment:   A negative result significantly reduces the likelihood that Ines has a hereditary cancer syndrome. However, this testing is unable to completely rule out the presence of hereditary cancer. It remains possible that:  There is a variant in an area of a gene which was not tested or there is a variant not detectable due to technical limitations of this test.     There is a variant in another gene that was not included in this test or in a gene not known to be linked to cancer or tumors.   A family member has a genetic variant that the patient did not inherit.   The cancer in the family is sporadic and is related to non-hereditary factors.     Risks and Testing for Family  Members:  Ines was made aware that all of her first-degree relatives are at increased risk to develop breast cancer based on her recent diagnosis and family history of breast cancer. We recommend that her first-degree relatives make their healthcare providers aware of the family history and discuss their options for screening and risk-reduction.    At this time we do not recommend testing for Ines 's children based on her negative test result.  Ines 's children still need to consider the history of cancer on the other side of their family when determining their risks.     If Ines has any affected family members with a cancer diagnosis, especially at a young age, they may still consider genetic testing. Relatives who wish to pursue genetic testing can reach out to the Cancer Risk and Genetics Program at [phone] to schedule an appointment or visit [email or web address] to identify a local genetic counselor.         Plan:     Negative Result: This result does not have surgical implications and surgical options should be discussed with her healthcare provider. Ines's breast surgeon, Dr. Anguiano will be made aware of her results

## 2024-06-07 NOTE — OP NOTE
OPERATIVE REPORT  PATIENT NAME: Ines Aviles    :  1960  MRN: [id number]  Pt Location: [facility] ROOM 05    SURGERY DATE: 2024    Surgeons and Role:     * Svetlana Anguiano MD - Primary     * Celeste Melchor PA-C - Assisting    Preop Diagnosis:  Infiltrating ductal carcinoma of left breast (HCC) [C50.912]    Post-Op Diagnosis Codes:     * Infiltrating ductal carcinoma of left breast (HCC) [C50.912]    Procedure(s):  Left - JOSEPH  LOCALIZED LUMPECTOMY LEFT BREAST W/SLN BX. LYMPHOSCINTIGRAPHY.LYMPHATIC MAPPING  Use of gamma probe  Injection of blue dye  Use of joseph   Use of specimen radiograph    Specimen(s):  ID Type Source Tests Collected by Time Destination   1 : LEFT breast lumpectomy shrot stitch superior, long stitch lateral Tissue Breast, Left TISSUE EXAM Svetlana Anguiano MD 2024 1207    2 : LEFT breast NEW superior margin- suture marks true margin Tissue Breast, Left TISSUE EXAM Svetlana Anguiano MD 2024 1208    3 : LEFT breast NEW medial margin- suture marks true margin Tissue Breast, Left TISSUE EXAM Svetlana Anguiano MD 2024 1209    4 : Left sentinel lymph node #1 Tissue Lymph Node, [place] TISSUE EXAM Svetlana Anguiano MD 2024 1222    5 : Left sentinel lymph node #2 Tissue Lymph Node, [place] TISSUE EXAM Svetlana Anguiano MD 2024 1222    6 : Left sentinel lymph node #3 Tissue Lymph Node, [place] TISSUE EXAM Svetlana Anguiano MD 2024 1223    7 : Left sentinel lymph node #4 Tissue Lymph Node, [place] TISSUE EXAM Svetlana Anguiano MD 2024 1227        Estimated Blood Loss:   Minimal    Drains:  * No LDAs found *    Anesthesia Type:   General    Operative Indications:  Infiltrating ductal carcinoma of left breast (HCC) [C50.912]      Operative Findings:  Joseph reflector and clip in specimen      Complications:   None    Procedure and Technique:  Victoria is a 64-year-old female who presents with left breast carcinoma.  She was counseled on a JOSEPH localized lumpectomy and sentinel node biopsy.   She presented the day of surgery to the radiology suite and underwent nuclear medicine injection of the left breast.  From there she went to the operating room.  She had preoperative antibiotics.  She was administered general anesthesia.  She had a 5 cc injection of Lymphazurin into the left subareolar plexus.  She was prepped and draped in the usual standard fashion.  Timeout was performed.  Attention was turned to the upper outer left breast.  The savvy probe was used to identify the reflector in roughly the 1:00 axis.  The skin was marked in this location.  Half percent Marcaine plain was injected for local anesthesia.  An elliptical incision was created through the skin and subcutaneous tissue.  Electrocautery was used to dissect margins superior, lateral, inferior, medial and posterior.  The savvy probe was used throughout to help guide dissection.  The specimen was marked with a short stitch superior and a long stitch lateral.  The specimen was interrogated to confirm reflector removal.  It was also imaged in the operating room revealing the standard clip and Mary closest to the superior and medial aspects.  Therefore new margins were excised in these locations and sutures were placed on the true margins.  All breast specimens were submitted to pathology in formalin.  Attention was then turned to the left axilla.  There was an area of increased radioactive uptake at the lateral pectoralis edge.  Additional half percent Marcaine plain was injected for local anesthesia.  An incision was created through the skin and subcutaneous tissue.  Electrocautery was used to dissect through the remaining subcutaneous tissue and clavipectoral fascia to enter the axillary fat pad.  Deep in the mid axilla was a radioactive node with a second adjacent blue stained and radioactive node.  These were elevated into the wound using an Allis clamp.  Hemoclips were placed on the draining vessels.  The nodes were excised and divided  and submitted separately.  There were 3 nodes present which were submitted as sentinel node 1, 2 and 3 of the left axilla.  There was an additional area of radioactivity high in the mid axilla which traced to a very small radioactive node.  This was excised and submitted as sentinel node for of the left axilla.  Following removal of this fourth node, there were no additional blue stained, radioactive or palpable nodes.  Both of her wounds were irrigated and hemostasis was achieved.  Hemoclips were placed within the lumpectomy cavity anticipated radiation therapy.  The wounds were then closed in a layered fashion using multiple interrupted 3-0 Monocryl suture and a running 4-0 Monocryl subcuticular stitch.  Counts were correct.  The skin was cleaned and dried.  Surgical glue, fluffs and a bra were applied.  The patient was then extubated and taken to recovery in stable condition.  A physician assistant was required during the procedure for retraction, tissue handling, dissection and suturing; no residents were available.    Patient Disposition:  extubated and stable    [place] Node Biopsy for Breast Cancer - Left  Operation performed with curative intent. Yes   Tracer(s) used to identify sentinel nodes in the upfront surgery (non-neoadjuvant) setting (select all that apply). Dye and Radioactive tracer   Tracer(s) used to identify sentinel nodes in the neoadjuvant setting (select all that apply). N/A   All nodes (colored or non-colored) present at the end of a dye-filled lymphatic channel were removed. Yes   All significantly radioactive nodes were removed. Yes   All palpably suspicious nodes were removed. N/A   Biopsy-proven positive nodes marked with clips prior to chemotherapy were identified and removed. N/A            SIGNATURE: Svetlana Anguiano MD  DATE: June 7, 2024  TIME: 12:44 PM

## 2024-06-08 ENCOUNTER — ANCILLARY PROCEDURE (OUTPATIENT)
Dept: LAB | Facility: HOSPITAL | Age: 64
End: 2024-06-08
Attending: SURGERY

## 2024-06-18 PROBLEM — Z17.0 MALIGNANT NEOPLASM OF UPPER-OUTER QUADRANT OF LEFT BREAST IN FEMALE, ESTROGEN RECEPTOR POSITIVE (HCC): Status: RESOLVED | Noted: 2024-05-21 | Resolved: 2024-06-18

## 2024-06-18 PROBLEM — C50.412 MALIGNANT NEOPLASM OF UPPER-OUTER QUADRANT OF LEFT BREAST IN FEMALE, ESTROGEN RECEPTOR POSITIVE (HCC): Status: RESOLVED | Noted: 2024-05-21 | Resolved: 2024-06-18

## 2024-06-20 ENCOUNTER — OFFICE VISIT (OUTPATIENT)
Dept: SURGICAL ONCOLOGY | Facility: CLINIC | Age: 64
End: 2024-06-20

## 2024-06-20 VITALS
OXYGEN SATURATION: 98 % | HEART RATE: 78 BPM | DIASTOLIC BLOOD PRESSURE: 78 MMHG | SYSTOLIC BLOOD PRESSURE: 118 MMHG | RESPIRATION RATE: 14 BRPM | TEMPERATURE: 98.4 F | WEIGHT: 144 LBS | BODY MASS INDEX: 23.99 KG/M2 | HEIGHT: 65 IN

## 2024-06-20 DIAGNOSIS — C50.912 INFILTRATING DUCTAL CARCINOMA OF LEFT BREAST (HCC): Primary | ICD-10-CM

## 2024-06-20 DIAGNOSIS — Z13.71 BRCA GENE MUTATION NEGATIVE: ICD-10-CM

## 2024-06-20 DIAGNOSIS — Z98.890 STATUS POST LEFT BREAST LUMPECTOMY: ICD-10-CM

## 2024-06-20 PROCEDURE — 99024 POSTOP FOLLOW-UP VISIT: CPT | Performed by: SURGERY

## 2024-06-20 NOTE — PROGRESS NOTES
64 y.o. female is here today s/p left lumpectomy and sentinel node biopsy. She reports swelling in the axilla.      Physical Exam  Constitutional:       General: She is not in acute distress.     Appearance: Normal appearance.   Chest:   Breasts:     Left: Swelling (In the axilla) and skin change (Well-healing incision in the breast and axilla with no signs of infection) present.      Comments: Aspirated approximately 40 cc of clear serous fluid  Neurological:      Mental Status: She is alert and oriented to person, place, and time.   Psychiatric:         Mood and Affect: Mood normal.         Data:       Stagin mm IDC  Tumor grade 2  LVI absent  Margins negative  Estrogen receptor and progesterone receptor status positive  HER2 status and test method negative  Lymph node assessment/status negative      Neoadjuvant therapy: Not applicable  Stage: IA        Diagnoses and all orders for this visit:    Infiltrating ductal carcinoma of left breast (HCC)  -     Ambulatory Referral to Hematology / Oncology; Future  -     Ambulatory Referral to Radiation Oncology; Future    Status post left breast lumpectomy    BRCA gene mutation negative        Assessment/Plan: 64-year-old female status post left breast conservation for an early-stage invasive ductal carcinoma.  She is healing well with no signs of infection.  I aspirated a clear seroma from the axilla today.  She needs to get set up with both medical and radiation oncology.  We will plan to see her in 6 months for survivorship visit or sooner should the need arise.

## 2024-06-27 ENCOUNTER — ANESTHESIA EVENT (OUTPATIENT)
Dept: ANESTHESIOLOGY | Facility: HOSPITAL | Age: 64
End: 2024-06-27

## 2024-06-27 ENCOUNTER — ANESTHESIA (OUTPATIENT)
Dept: ANESTHESIOLOGY | Facility: HOSPITAL | Age: 64
End: 2024-06-27

## 2024-07-03 ENCOUNTER — TELEPHONE (OUTPATIENT)
Dept: HEMATOLOGY ONCOLOGY | Facility: CLINIC | Age: 64
End: 2024-07-03

## 2024-07-03 ENCOUNTER — CONSULT (OUTPATIENT)
Dept: RADIATION ONCOLOGY | Facility: HOSPITAL | Age: 64
End: 2024-07-03
Attending: SURGERY
Payer: COMMERCIAL

## 2024-07-03 VITALS
HEART RATE: 80 BPM | RESPIRATION RATE: 18 BRPM | OXYGEN SATURATION: 98 % | TEMPERATURE: 98.8 F | DIASTOLIC BLOOD PRESSURE: 84 MMHG | SYSTOLIC BLOOD PRESSURE: 124 MMHG

## 2024-07-03 DIAGNOSIS — C50.912 INFILTRATING DUCTAL CARCINOMA OF LEFT BREAST (HCC): ICD-10-CM

## 2024-07-03 PROCEDURE — 99211 OFF/OP EST MAY X REQ PHY/QHP: CPT | Performed by: STUDENT IN AN ORGANIZED HEALTH CARE EDUCATION/TRAINING PROGRAM

## 2024-07-03 PROCEDURE — 99245 OFF/OP CONSLTJ NEW/EST HI 55: CPT | Performed by: STUDENT IN AN ORGANIZED HEALTH CARE EDUCATION/TRAINING PROGRAM

## 2024-07-03 NOTE — TELEPHONE ENCOUNTER
Received call from Dr. Gilliam regarding submitting OncoType for patient, submitted request on 7/3/24 for bx taken on 6/7.

## 2024-07-03 NOTE — PROGRESS NOTES
Ines Aviles 1960 is a 64 y.o. female with newly diagnosed carcinoma of the left breast.  She was asymptomatic referable to the breast. She had an outside abnormal mammogram done in [place].  She had a biopsy with them as well, revealing invasive ductal carcinoma, grade 1, , NH 95, HER 2 1+, Ki-67 3. Genetic testing was negative. On 6/7/23 she underwent left breast joseph  localized lumpectomy/SLN biopsy, with Dr. Anguiano. Pathology: Invasive ductal carcinoma of no special type (ductal), grade 1, 8 mm, margins negative, 0/4 lymph nodes. She presents today for initial consult for consideration of RT.    4/22/24 Mammogram diagnostic w corky left  US breast w/ elastography bilat  Mammogram screening w corky  IMPRESSION:  Left breast 1:00 US and mammography finding for which ultrasound-guided biopsy is recommended.   ACR BI-RADS CATEGORY: 4 - Suspicious Abnormality Recommendation: Return for US guided breast biopsy    5/1/24 Biopsy:  Left breast ultrasound-guided biopsy ([place], [place])  1 o'clock, 4-5 cm from nipple (ribbon)  Invasive ductal carcinoma  Grade 1  , NH 95, HER2 1+, Ki-67 3  One focus suspicious for lymphovascular invasion     Concordant. Malignancy appears unifocal; measures up to 6 mm. No left axillary adenopathy. Right breast clear.       5/20/24 Surgical Oncology - Dr. Anguiano  Left breast carcinoma. This is unifocal based on outside imaging. This is nonpalpable on exam.   Will call with results of genetic testing.   Genetic testing is negative and we have no additional concerns on the outside image review, she would be a good candidate for breast conservation.   If she has any actionable gene mutations, she states that she would obviously consider bilateral mastectomy.     5/30/24 Surgical Oncology - Dr. Anguiano  Genetic testing is so far negative.  Outside image review was performed and she has a unifocal carcinoma only in the left breast.   Counseled her on a JOSEPH localized  lumpectomy of the left breast along with lymphatic mapping and sentinel node biopsy.     6/7/24 Surgery - Dr. Anguiano  Left breast DARCIE  directed lumpectomy with SLN biopsy     Final Diagnosis   A. Breast, Left, lumpectomy:  - Invasive mammary carcinoma of no special type, 8 mm in greatest dimension, with associated DCIS.  See comment and synoptic report.      B. Breast, Left, new superior margin:  - Benign breast tissue.      C. Breast, Left, new medial margin:  - Benign fibroadipose tissue.      D. Left sentinel lymph node #1:  - One benign lymph node (0/1).      E. Left sentinel lymph node #2:  - One benign lymph node (0/1).       F. Left sentinel lymph node #3:  - One benign lymph node (0/1).       G. Left sentinel lymph node #4:  - One benign lymph node (0/1).         Synoptic Checklist   INVASIVE CARCINOMA OF THE BREAST: Resection   8th Edition - Protocol posted: 12/13/2023INVASIVE CARCINOMA OF THE BREAST: RESECTION - All Specimens  SPECIMEN   Procedure  Excision (less than total mastectomy)   Specimen Laterality  Left   TUMOR   Histologic Type  Invasive carcinoma of no special type (ductal)   Histologic Grade (Kaycee Histologic Score)     Glandular (Acinar) / Tubular Differentiation  Score 2   Nuclear Pleomorphism  Score 2   Mitotic Rate  Score 1   Overall Grade  Grade 1 (scores of 3, 4 or 5)   Tumor Size  Greatest dimension of largest invasive focus (Millimeters): 8 mm   Tumor Focality  Single focus of invasive carcinoma   Ductal Carcinoma In Situ (DCIS)  Present     Negative for extensive intraductal component (EIC)   Architectural Patterns  Solid   Nuclear Grade  Grade II (intermediate)   Lymphatic and / or Vascular Invasion  Not identified   Treatment Effect in the Breast  No known presurgical therapy   MARGINS   Margin Status for Invasive Carcinoma  All margins negative for invasive carcinoma   Distance from Invasive Carcinoma to Closest Margin  Greater than: 10 mm   Closest Margin(s) to Invasive  Carcinoma  >10 mm all margins   Margin Status for DCIS  All margins negative for DCIS   Distance from DCIS to Closest Margin  Greater than: 10 mm   Closest Margin(s) to DCIS  >10 mm all margins   REGIONAL LYMPH NODES   Regional Lymph Node Status  All regional lymph nodes negative for tumor   Total Number of Lymph Nodes Examined (sentinel and non-sentinel)  4   Number of [place] Nodes Examined  4   pTNM CLASSIFICATION (AJCC 8th Edition)   Reporting of pT, pN, and (when applicable) pM categories is based on information available to the pathologist at the time the report is issued. As per the AJCC (Chapter 1, 8th Ed.) it is the managing physician’s responsibility to establish the final pathologic stage based upon all pertinent information, including but potentially not limited to this pathology report.   pT Category  pT1b   pN Category  pN0   N Suffix  (sn)          24 Surgical Oncology - Dr. Anguiano  Swelling noted left axilla - 40 cc clear serous fluid aspirated  Well healing incision in the breast, no s/s infection  Will be seeing both medical and radiation oncology    Upcomin24 Hematology Oncology - Dr. Gilliam  25 Surgical Oncology - [facility]     Oncology History   Infiltrating ductal carcinoma of left breast (HCC)   2024 Biopsy    Left breast ultrasound-guided biopsy ([place], [place])  1 o'clock, 4-5 cm from nipple (ribbon)  Invasive ductal carcinoma  Grade 1  , MI 95, HER2 1+, Ki-67 3  One focus suspicious for lymphovascular invasion    Concordant. Malignancy appears unifocal; measures up to 6 mm. No left axillary adenopathy. Right breast clear.     2024 -  Cancer Staged    Staging form: Breast, AJCC 8th Edition  - Clinical stage from 2024: Stage IA (cT1, cN0, cM0, G1, ER+, MI+, HER2-) - Signed by Svetlana Anguiano MD on 2024  Stage prefix: Initial diagnosis  Method of lymph node assessment: Clinical  Histologic grading system: 3 grade system       2024 Genetic Testing     NEGATIVE: No Clinically Significant Variants Detected  A total of 59 genes were evaluated with RNA insight: APC, FREDA, BAP1, BARD1, BMPR1A, BRCA1, BRCA2, BRIP1, CDH1, CDK4, CDKN1B, CDKN2A, CHEK2, DICER1, FH, FLCN, KIF1B, MAX, MEN1, MET, MLH1, MSH2, MSH6, MUTYH, NF1, NTHL1, PALB2, PMS2, POT1, PTEN, RAD51C, RAD51D, RB1, RET, SDHA, SDHAF2, SDHB, SDHC, SDHD, SMAD4, SMARCA4, STK11, HTRA833, TP53, TSC1, TSC2 and VHL (sequencing and deletion/duplication); AXIN2, CTNNA1, EGLN1, HOXB13, KIT, MITF, MSH3, PDGFRA, POLD1 and POLE (sequencing only); EPCAM and GREM1 (deletion/duplication only).  Reymundory     2024 Surgery    Left breast DARCIE  directed lumpectomy with SLN biopsy  Invasive carcinoma of no special type (ductal)  Grade 1  8 mm  Margins negative  0/4 lymph nodes     2024 -  Cancer Staged    Staging form: Breast, AJCC 8th Edition  - Pathologic stage from 2024: pT1b, pN0(sn), cM0, G2, ER+, IA+ - Signed by Svetlana Anguiano MD on 2024  Stage prefix: Initial diagnosis  Method of lymph node assessment: [place] lymph node biopsy  Histologic grading system: 3 grade system           Review of Systems:  Review of Systems   Constitutional: Negative.    HENT: Negative.     Eyes: Negative.    Respiratory: Negative.     Cardiovascular: Negative.    Gastrointestinal: Negative.    Endocrine: Negative.    Genitourinary: Negative.    Musculoskeletal: Negative.    Skin: Negative.    Allergic/Immunologic: Negative.    Neurological: Negative.    Hematological: Negative.    Psychiatric/Behavioral: Negative.         Clinical Trial: no    OB/GYN History:  The patient underwent menarche at 13 years  Menopause Status Post  No LMP recorded. Patient is postmenopausal.  Menopause at 56 years  Menopause Reason: Natural   Hormone replacement therapy: no.  Years used 0    3   Para 3   Age at first delivery being 29 years.   Nursing: no.   Birth control pills: yes.  Years used 10    Pregnancy test needed:   no    ONCOTYPE/MAMMOPRINT results: none    Prior Radiation: No    Teaching: NCI radiation packet    MST: Completed     Implantable Devices (Port, Pacemaker, pain stimulator): No    Hip Replacement: No      Health Maintenance   Topic Date Due    HIV Screening  Never done    Cervical Cancer Screening  Never done    Zoster Vaccine (1 of 2) Never done    RSV Vaccine Age 60+ Years (1 - 1-dose 60+ series) Never done    COVID-19 Vaccine (4 - -24 season) 2023    Influenza Vaccine (1) 2024    Annual Physical  2025    Breast Cancer Screening: Mammogram  2025    BMI: Adult  2025    Depression Screening  2025    Colorectal Cancer Screening  2027    DTaP,Tdap,and Td Vaccines (2 - Td or Tdap) 2029    Hepatitis C Screening  Completed    RSV Vaccine age 0-20 Months  Aged Out    Pneumococcal Vaccine: Pediatrics (0 to 5 Years) and At-Risk Patients (6 to 64 Years)  Aged Out    HIB Vaccine  Aged Out    IPV Vaccine  Aged Out    Hepatitis A Vaccine  Aged Out    Meningococcal ACWY Vaccine  Aged Out    HPV Vaccine  Aged Out     Past Medical History:   Diagnosis Date    Breast cancer (HCC)      Past Surgical History:   Procedure Laterality Date     SECTION      OH BX/EXC LYMPH NODE OPEN SUPERFICIAL Left 2024    Procedure: DARCIE  LOCALIZED LUMPECTOMY LEFT BREAST W/SLN BX, LYMPHOSCINTIGRAPHY,LYMPHATIC MAPPING;  Surgeon: Svetlana Anguiano MD;  Location: [facility];  Service: Surgical Oncology    OH EXC BREAST LES PREOP PLMT RAD MARKER OPEN 1 LES Left 2024    Procedure: DARCIE  LOCALIZED LUMPECTOMY LEFT BREAST W/SLN BX, LYMPHOSCINTIGRAPHY,LYMPHATIC MAPPING;  Surgeon: Svetlana Anguiano MD;  [facility];  Service: Surgical Oncology    OH INTRAOP SENTINEL LYMPH NODE ID W/DYE INJECTION Left 2024    Procedure: DARCIE  LOCALIZED LUMPECTOMY LEFT BREAST W/SLN BX, LYMPHOSCINTIGRAPHY,LYMPHATIC MAPPING;  Surgeon: Svetlana Anguiano MD;  [facility];  Service: Surgical  Oncology    NV MASTECTOMY PARTIAL Left 2024    Procedure: DARCIE  LOCALIZED LUMPECTOMY LEFT BREAST W/SLN BX, LYMPHOSCINTIGRAPHY,LYMPHATIC MAPPING;  Surgeon: Svetlana Anguiano MD;  [facility]: [facility];  Service: Surgical Oncology    US BREAST NEEDLE LOC LEFT Left 2024     Family History   Problem Relation Age of Onset    Diabetes Mother     Hypertension Mother     No Known Problems Father     No Known Problems Brother      Social History     Tobacco Use    Smoking status: Former     Current packs/day: 0.00     Average packs/day: 0.3 packs/day for 5.0 years (1.2 ttl pk-yrs)     Types: Cigarettes     Start date: 6/15/1979     Quit date: 1/15/1984     Years since quittin.4     Passive exposure: Never    Smokeless tobacco: Never   Vaping Use    Vaping status: Never Used   Substance Use Topics    Alcohol use: Yes     Alcohol/week: 8.0 standard drinks of alcohol     Types: 8 Glasses of wine per week     Comment: 1-2 glass of wine nightly. last drank     Drug use: Never        Current Outpatient Medications:     traMADol (Ultram) 50 mg tablet, Take 1 tablet (50 mg total) by mouth every 8 (eight) hours as needed for moderate pain (Patient not taking: Reported on 2024), Disp: 9 tablet, Rfl: 0  Allergies   Allergen Reactions    Erythromycin GI Intolerance    Levaquin [Levofloxacin] Rash    Macrobid [Nitrofurantoin] Rash      Vitals:    24 1059   BP: 124/84   BP Location: Left arm   Patient Position: Sitting   Cuff Size: Standard   Pulse: 80   Resp: 18   Temp: 98.8 °F (37.1 °C)   TempSrc: Temporal   SpO2: 98%     Pain Score: 0-No pain

## 2024-07-05 ENCOUNTER — TELEPHONE (OUTPATIENT)
Dept: HEMATOLOGY ONCOLOGY | Facility: CLINIC | Age: 64
End: 2024-07-05

## 2024-07-08 NOTE — PROGRESS NOTES
Consultation - Radiation Oncology      MRN:[id number] : 1960  Encounter: [id number]  Patient Information: Ines Aviles      CHIEF COMPLAINT  Chief Complaint   Patient presents with    Consult     Radiation Oncology     Cancer Staging   Infiltrating ductal carcinoma of left breast (HCC)  Staging form: Breast, AJCC 8th Edition  - Clinical stage from 2024: Stage IA (cT1, cN0, cM0, G1, ER+, MA+, HER2-) - Signed by Svetlana Anguiano MD on 2024  Stage prefix: Initial diagnosis  Method of lymph node assessment: Clinical  Histologic grading system: 3 grade system  - Pathologic stage from 2024: pT1b, pN0(sn), cM0, G2, ER+, MA+ - Signed by Svetlana Anguiano MD on 2024  Stage prefix: Initial diagnosis  Method of lymph node assessment: [place] lymph node biopsy  Histologic grading system: 3 grade system    Assessment and Plan:   Ms. Ines Aviles is a 64 year old postmenopausal woman with recently diagnosed Stage IA (pT1bN0) IDC of the left breast. She is s/p lumpectomy/SLNBx with pathology demonstrating an 8mm primary, G1; DCIS+ without EIC; LVSI-, 0/4 SLNs involved; ER+/MA+/Her2-, Ki67 3%. She is seen today in consideration of adjuvant RT options.     We reviewed the role of adjuvant RT following lumpectomy in early stage breast cancer. Specifically we reviewed that multiple studies have supported the use of RT in this setting to lower the risk for locoregional recurrence and breast cancer mortality. We discussed that given her overall favorable risk features, the expected benefit from RT would be correspondingly low. We did discuss potential enrollment in NRG , pending Oncotype testing; a message was sent to Dr. Gilliam in order to have this testing performed prior to her upcoming medical oncology appointment.     We discussed the acute and late toxicities associated with RT in this setting. Acute side effects include, but are not limited to, fatigue, erythema/desquamation of the irradiated  skin, pain, swelling, and arm stiffness. Late effects of RT include, but are not limited to, fibrosis of the skin/musculature, pneumonitis, increased risk for cardiovascular disease, and lymphedema.     If the patient were to proceed with RT, I would favor APBI to a dose of 30 Gy in 5 fractions ([place]). The patient would like to consider options but tentatively is hoping to avoid RT. I will remain available should any questions arise in the coming weeks.     Summary:   - Multiple options reviewed (hypofractionated WBRT, APBI, RT omission, NRG  enrollment)  - Pending medical oncology consultation and oncotype testing.   - Patient to consider options.        History of Present Illness   Ms. Ines Aviles is a 64 year old otherwise healthy woman who was in her usual state of health until mammogram demonstrated an abnormality in the left breast (1:00) with subsequent biopsy demonstrating IDC, grade 1, ER+ (100%)/RI+ (95%)/Her2- (1+ IHC), Ki67 3%. She was seen by Dr. Anguiano and underwent genetic testing, which identified no genetic predisposition towards malignancy. After consideration she underwent left breast lumpectomy/SLNBx. Pathology demonstrated IDC, 8mm in greatest dimension, grade 1; DCIS present, no EIC, grade II; LVSI-, 0/4 SLNs involved; margins- (>10mm); pT1bN0. Postoperatively she had some mild axillary swelling requiring seroma drainage but otherwise did well. She is scheduled to see Dr. Gilliam on 8/1/24.     Currently she is doing well overall. She has recovered nicely following surgery with minimal post-procedural pain. She has no UE edema, no restricted ROM. She is here today with her  and lives nearby in [place].       Historical Information   Oncology History   Infiltrating ductal carcinoma of left breast (HCC)   5/1/2024 Biopsy    Left breast ultrasound-guided biopsy ([place], [place])  1 o'clock, 4-5 cm from nipple (ribbon)  Invasive ductal carcinoma  Grade 1  , RI 95, HER2 1+,  Ki-67 3  One focus suspicious for lymphovascular invasion    Concordant. Malignancy appears unifocal; measures up to 6 mm. No left axillary adenopathy. Right breast clear.     2024 -  Cancer Staged    Staging form: Breast, AJCC 8th Edition  - Clinical stage from 2024: Stage IA (cT1, cN0, cM0, G1, ER+, [place]+, HER2-) - Signed by Svetlana Anguiano MD on 2024  Stage prefix: Initial diagnosis  Method of lymph node assessment: Clinical  Histologic grading system: 3 grade system       2024 Genetic Testing    NEGATIVE: No Clinically Significant Variants Detected  A total of 59 genes were evaluated with RNA insight: APC, FREDA, BAP1, BARD1, BMPR1A, BRCA1, BRCA2, BRIP1, CDH1, CDK4, CDKN1B, CDKN2A, CHEK2, DICER1, FH, FLCN, KIF1B, MAX, MEN1, MET, MLH1, MSH2, MSH6, MUTYH, NF1, NTHL1, PALB2, PMS2, POT1, PTEN, RAD51C, RAD51D, RB1, RET, SDHA, SDHAF2, SDHB, SDHC, SDHD, SMAD4, SMARCA4, STK11, YMIN959, TP53, TSC1, TSC2 and VHL (sequencing and deletion/duplication); AXIN2, CTNNA1, EGLN1, HOXB13, KIT, MITF, MSH3, PDGFRA, POLD1 and POLE (sequencing only); EPCAM and GREM1 (deletion/duplication only).  [facility]meet     2024 Surgery    Left breast DARCIE  directed lumpectomy with SLN biopsy  Invasive carcinoma of no special type (ductal)  Grade 1  8 mm  Margins negative  0/4 lymph nodes     2024 -  Cancer Staged    Staging form: Breast, AJCC 8th Edition  - Pathologic stage from 2024: pT1b, pN0(sn), cM0, G2, ER+, [place]+ - Signed by Svetlana Anguiano MD on 2024  Stage prefix: Initial diagnosis  Method of lymph node assessment: [place] lymph node biopsy  Histologic grading system: 3 grade system             Past Medical History:   Diagnosis Date    Breast cancer (HCC)      Past Surgical History:   Procedure Laterality Date     SECTION      [place] BX/EXC LYMPH NODE OPEN SUPERFICIAL Left 2024    Procedure: DARCIE  LOCALIZED LUMPECTOMY LEFT BREAST W/SLN BX, LYMPHOSCINTIGRAPHY,LYMPHATIC MAPPING;  Surgeon: Svetlana Anguiano,  MD;  [facility];  Service: Surgical Oncology    MD EXC BREAST LES PREOP PLMT RAD MARKER OPEN 1 LES Left 2024    Procedure: DARCIE  LOCALIZED LUMPECTOMY LEFT BREAST W/SLN BX, LYMPHOSCINTIGRAPHY,LYMPHATIC MAPPING;  Surgeon: Svetlana Anguiano MD;  [facility];  Service: Surgical Oncology    MD INTRAOP SENTINEL LYMPH NODE ID W/DYE INJECTION Left 2024    Procedure: DARCIE  LOCALIZED LUMPECTOMY LEFT BREAST W/SLN BX, LYMPHOSCINTIGRAPHY,LYMPHATIC MAPPING;  Surgeon: Svetlana Anguiano MD;  [facility];  Service: Surgical Oncology    MD MASTECTOMY PARTIAL Left 2024    Procedure: DARCIE  LOCALIZED LUMPECTOMY LEFT BREAST W/SLN BX, LYMPHOSCINTIGRAPHY,LYMPHATIC MAPPING;  Surgeon: Svetlana Anguiano MD;  [facility];  Service: Surgical Oncology    US BREAST NEEDLE LOC LEFT Left 2024       Family History   Problem Relation Age of Onset    Diabetes Mother     Hypertension Mother     No Known Problems Father     No Known Problems Brother        Social History   Social History     Substance and Sexual Activity   Alcohol Use Yes    Alcohol/week: 8.0 standard drinks of alcohol    Types: 8 Glasses of wine per week    Comment: 1-2 glass of wine nightly. last drank      Social History     Substance and Sexual Activity   Drug Use Never     Social History     Tobacco Use   Smoking Status Former    Current packs/day: 0.00    Average packs/day: 0.3 packs/day for 5.0 years (1.2 ttl pk-yrs)    Types: Cigarettes    Start date: 6/15/1979    Quit date: 1/15/1984    Years since quittin.5    Passive exposure: Never   Smokeless Tobacco Never         Meds/Allergies     Current Outpatient Medications:     traMADol (Ultram) 50 mg tablet, Take 1 tablet (50 mg total) by mouth every 8 (eight) hours as needed for moderate pain (Patient not taking: Reported on 2024), Disp: 9 tablet, Rfl: 0  Allergies   Allergen Reactions    Erythromycin GI Intolerance    Levaquin [Levofloxacin] Rash    Macrobid  [Nitrofurantoin] Rash     Review of Systems   Constitutional: Negative.    HENT: Negative.     Eyes: Negative.    Respiratory: Negative.     Cardiovascular: Negative.    Gastrointestinal: Negative.    Endocrine: Negative.    Genitourinary: Negative.    Musculoskeletal: Negative.    Skin: Negative.    Allergic/Immunologic: Negative.    Neurological: Negative.    Hematological: Negative.    Psychiatric/Behavioral: Negative.           Clinical Trial: no     OB/GYN History:  The patient underwent menarche at 13 years  Menopause Status Post  No LMP recorded. Patient is postmenopausal.  Menopause at 56 years  Menopause Reason: Natural   Hormone replacement therapy: no.  Years used 0    3   Para 3   Age at first delivery being 29 years.   Nursing: no.   Birth control pills: yes.  Years used 10       OBJECTIVE:   /84 (BP Location: Left arm, Patient Position: Sitting, Cuff Size: Standard)   Pulse 80   Temp 98.8 °F (37.1 °C) (Temporal)   Resp 18   SpO2 98%   Pain Assessment:  0  Performance Status: ECOG/Zubrod/WHO: 0 - Asymptomatic    Physical Exam   Well appearing. NAD.   No increased work of breathing.   Extremities warm and well perfused. No UE edema.   Breast exam deferred to time of CT simulation.     RESULTS  Lab Results    Chemistry        Component Value Date/Time    K 3.8 2024 0809    K 4.3 2021 0846     2024 0809     2021 0846    CO2 26 2024 0809    CO2 28 2021 0846    BUN 14 2024 0809    BUN 14 2021 0846    CREATININE 0.77 2024 0809        Component Value Date/Time    CALCIUM 9.2 2024 0809    CALCIUM 9.5 2021 0846    ALKPHOS 77 2024 0809    ALKPHOS 82 2021 0846    AST 18 2024 0809    AST 15 2021 0846    ALT 13 2024 0809    ALT 13 2021 0846            Lab Results   Component Value Date    WBC 6.83 2024    HGB 14.6 2024    HCT 44.7 2024    MCV 95 2024      "05/31/2024         Imaging Studies  Pathology Department [facility] Imaging Study    Result Date: 6/8/2024  Narrative: This is a non-diagnostic study. Please refer to the Pathology report.    Pathology:As above.     ASSESSMENT  1. Infiltrating ductal carcinoma of left breast (HCC)  Ambulatory Referral to Radiation Oncology        Cancer Staging   Infiltrating ductal carcinoma of left breast (HCC)  Staging form: Breast, AJCC 8th Edition  - Clinical stage from 5/1/2024: Stage IA (cT1, cN0, cM0, G1, ER+, RI+, HER2-) - Signed by Svetlana Anguiano MD on 5/20/2024  Stage prefix: Initial diagnosis  Method of lymph node assessment: Clinical  Histologic grading system: 3 grade system  - Pathologic stage from 6/7/2024: pT1b, pN0(sn), cM0, G2, ER+, RI+ - Signed by Svetlana Anguiano MD on 6/20/2024  Stage prefix: Initial diagnosis  Method of lymph node assessment: [place] lymph node biopsy  Histologic grading system: 3 grade system      PLAN/DISCUSSION  No orders of the defined types were placed in this encounter.       Pato Aguilar MD  7/7/2024,10:10 PM    Total time spent reviewing EMR, seeing patient in clinic visit, documenting visit, placing treatment orders, and communicating with other medical providers on date of visit:  50 minutes.       Portions of the record may have been created with voice recognition software.  Occasional wrong word or \"sound a like\" substitutions may have occurred due to the inherent limitations of voice recognition software.  Read the chart carefully and recognize, using context, where substitutions have occurred.          "

## 2024-07-09 ENCOUNTER — OFFICE VISIT (OUTPATIENT)
Dept: HEMATOLOGY ONCOLOGY | Facility: CLINIC | Age: 64
End: 2024-07-09
Payer: COMMERCIAL

## 2024-07-09 ENCOUNTER — TELEPHONE (OUTPATIENT)
Age: 64
End: 2024-07-09

## 2024-07-09 VITALS
OXYGEN SATURATION: 98 % | RESPIRATION RATE: 17 BRPM | DIASTOLIC BLOOD PRESSURE: 78 MMHG | BODY MASS INDEX: 24.16 KG/M2 | HEIGHT: 65 IN | SYSTOLIC BLOOD PRESSURE: 108 MMHG | TEMPERATURE: 97.9 F | HEART RATE: 75 BPM | WEIGHT: 145 LBS

## 2024-07-09 DIAGNOSIS — C50.912 INFILTRATING DUCTAL CARCINOMA OF LEFT BREAST (HCC): ICD-10-CM

## 2024-07-09 DIAGNOSIS — C50.412 MALIGNANT NEOPLASM OF UPPER-OUTER QUADRANT OF LEFT BREAST IN FEMALE, ESTROGEN RECEPTOR POSITIVE (HCC): Primary | ICD-10-CM

## 2024-07-09 DIAGNOSIS — M81.8 DRUG-INDUCED OSTEOPOROSIS: ICD-10-CM

## 2024-07-09 DIAGNOSIS — Z17.0 MALIGNANT NEOPLASM OF UPPER-OUTER QUADRANT OF LEFT BREAST IN FEMALE, ESTROGEN RECEPTOR POSITIVE (HCC): Primary | ICD-10-CM

## 2024-07-09 DIAGNOSIS — T50.905A DRUG-INDUCED OSTEOPOROSIS: ICD-10-CM

## 2024-07-09 PROCEDURE — 99205 OFFICE O/P NEW HI 60 MIN: CPT | Performed by: INTERNAL MEDICINE

## 2024-07-09 NOTE — TELEPHONE ENCOUNTER
Pt calling in to let Dr. Aguilar's office that she will be pursuing radiation therapy. Pt. Is looking for a phone call to go over any next steps and questions about treatment..     Please call pt back at [phone]

## 2024-07-09 NOTE — PROGRESS NOTES
Hematology/Oncology Outpatient Initial Consultation  Ines Aviles 64 y.o. female 1960 [id number]  Referral For: Left breast infiltrating ductal carcinoma  Consultation placed by: Victoria Anguiano MD  Date of consultation: 7/9/2024      HPI:    64-year-old  female with no significant past medical history underwent routine screening mammogram on 2/25/2023 with abnormal cysts recommending further diagnostic mammogram and ultrasound to confirm.  Diagnostic bilateral mammogram 4/2024 left breast upper outer quadrant round indistinctly marginated mass with ultrasound showing hypoechoic mass with echogenic rim 1:00 5 cm from the nipple measuring 6 x 6 x 6 mm with no left axillary adenopathy.  Biopsy performed on 5/1/2024 with pathology showing IDC measuring up to 0.6 cm, immunohistochemistry stains ER +100%, VA +95%, HER2 negative, Ki-67 3%.  Patient followed up with breast surgeon, Svetlana Anguiano MD with left-sided lumpectomy performed on 6/7/2024 with tissue pathology showing invasive mammary carcinoma with no specific type 8 mm in greatest dimension with associated DCIS, left sentinel lymph node 0/4.  Ancillary studies ER +100%, VA +95% hurst to ICH +1/low.  The patient was seen by radiation oncologist, Dr. Aguilar in favor of RT in the setting of low risk of local regional recurrence and cancer mortality benefits, however at this time patient would like to wait until Oncotype testing. Additional, genetic testing negative for BRCA1/2.      Patient is here in office today, stated to be feeling great.  Stated that her surgery went very well with minimal pain only needing Motrin.  She is currently not taking tramadol.  The patient notes that she had her menses around age 12/13, she is currently postmenopausal.  She has had 3 children.  No immediate family history of breast cancer.  She smoked greater than 20 years ago noting that she only smoked prior to childbirth.      She denies any left arm swelling, back  pain, fevers or chills.    She is interested in radiation.  Main concern concerning aromatase inhibitors is joint stiffness.  Side effects were discussed with patient and is amenable to AI therapy.  No other additional questions at this visit.        Oncology History   Infiltrating ductal carcinoma of left breast (HCC)   5/1/2024 Biopsy    Left breast ultrasound-guided biopsy ([place], [place])  1 o'clock, 4-5 cm from nipple (ribbon)  Invasive ductal carcinoma  Grade 1  , [place] 95, HER2 1+, Ki-67 3  One focus suspicious for lymphovascular invasion    Concordant. Malignancy appears unifocal; measures up to 6 mm. No left axillary adenopathy. Right breast clear.     5/1/2024 -  Cancer Staged    Staging form: Breast, AJCC 8th Edition  - Clinical stage from 5/1/2024: Stage IA (cT1, cN0, cM0, G1, ER+, [place]+, HER2-) - Signed by Svetlana Anguiano MD on 5/20/2024  Stage prefix: Initial diagnosis  Method of lymph node assessment: Clinical  Histologic grading system: 3 grade system       5/13/2024 Genetic Testing    NEGATIVE: No Clinically Significant Variants Detected  A total of 59 genes were evaluated with RNA insight: APC, FREDA, BAP1, BARD1, BMPR1A, BRCA1, BRCA2, BRIP1, CDH1, CDK4, CDKN1B, CDKN2A, CHEK2, DICER1, FH, FLCN, KIF1B, MAX, MEN1, MET, MLH1, MSH2, MSH6, MUTYH, NF1, NTHL1, PALB2, PMS2, POT1, PTEN, RAD51C, RAD51D, RB1, RET, SDHA, SDHAF2, SDHB, SDHC, SDHD, SMAD4, SMARCA4, STK11, PTYO743, TP53, TSC1, TSC2 and VHL (sequencing and deletion/duplication); AXIN2, CTNNA1, EGLN1, HOXB13, KIT, MITF, MSH3, PDGFRA, POLD1 and POLE (sequencing only); EPCAM and GREM1 (deletion/duplication only).  [facility]meet     6/7/2024 Surgery    Left breast ADRCIE  directed lumpectomy with SLN biopsy  Invasive carcinoma of no special type (ductal)  Grade 1  8 mm  Margins negative  0/4 lymph nodes     6/7/2024 -  Cancer Staged    Staging form: Breast, AJCC 8th Edition  - Pathologic stage from 6/7/2024: pT1b, pN0(sn), cM0, G2, ER+, KS+ - Signed by Svetlana  MD Annmarie on 2024  Stage prefix: Initial diagnosis  Method of lymph node assessment: [place] lymph node biopsy  Histologic grading system: 3 grade system           ROS: Review of Systems   All other systems reviewed and are negative.      Past Medical History:   Diagnosis Date    Breast cancer (HCC)        Past Surgical History:   Procedure Laterality Date     SECTION      [place] BX/EXC LYMPH NODE OPEN SUPERFICIAL Left 2024    Procedure: DARCIE  LOCALIZED LUMPECTOMY LEFT BREAST W/SLN BX, LYMPHOSCINTIGRAPHY,LYMPHATIC MAPPING;  Surgeon: Svetlana Anguiano MD;  [facility];  Service: Surgical Oncology    [place] EXC BREAST LES PREOP PLMT RAD MARKER OPEN 1 LES Left 2024    Procedure: DARCIE  LOCALIZED LUMPECTOMY LEFT BREAST W/SLN BX, LYMPHOSCINTIGRAPHY,LYMPHATIC MAPPING;  Surgeon: Svetlana Anguiano MD;  [facility];  Service: Surgical Oncology    [place] INTRAOP SENTINEL LYMPH NODE ID W/DYE INJECTION Left 2024    Procedure: DARCIE  LOCALIZED LUMPECTOMY LEFT BREAST W/SLN BX, LYMPHOSCINTIGRAPHY,LYMPHATIC MAPPING;  Surgeon: Svetlana Anguiano MD;  [facility];  Service: Surgical Oncology    [place] MASTECTOMY PARTIAL Left 2024    Procedure: DARCIE  LOCALIZED LUMPECTOMY LEFT BREAST W/SLN BX, LYMPHOSCINTIGRAPHY,LYMPHATIC MAPPING;  Surgeon: Svetlana Anguiano MD;  [facility];  Service: Surgical Oncology    US BREAST NEEDLE LOC LEFT Left 2024       Social History     Socioeconomic History    Marital status: /Civil Union     Spouse name: Not on file    Number of children: Not on file    Years of education: Not on file    Highest education level: Not on file   Occupational History    Not on file   Tobacco Use    Smoking status: Former     Current packs/day: 0.00     Average packs/day: 0.3 packs/day for 5.0 years (1.2 ttl pk-yrs)     Types: Cigarettes     Start date: 6/15/1979     Quit date: 1/15/1984     Years since quittin.5     Passive exposure: Never    Smokeless  tobacco: Never   Vaping Use    Vaping status: Never Used   Substance and Sexual Activity    Alcohol use: Yes     Alcohol/week: 8.0 standard drinks of alcohol     Types: 8 Glasses of wine per week     Comment: 1-2 glass of wine nightly. last drank 6/6    Drug use: Never    Sexual activity: Not Currently   Other Topics Concern    Not on file   Social History Narrative    Not on file     Social Determinants of Health     Financial Resource Strain: Not on file   Food Insecurity: Not on file   Transportation Needs: Not on file   Physical Activity: Not on file   Stress: Not on file   Social Connections: Not on file   Intimate Partner Violence: Not on file   Housing Stability: Not on file       Family History   Problem Relation Age of Onset    Diabetes Mother     Hypertension Mother     No Known Problems Father     No Known Problems Brother        Allergies   Allergen Reactions    Erythromycin GI Intolerance    Levaquin [Levofloxacin] Rash    Macrobid [Nitrofurantoin] Rash         Current Outpatient Medications:     traMADol (Ultram) 50 mg tablet, Take 1 tablet (50 mg total) by mouth every 8 (eight) hours as needed for moderate pain (Patient not taking: Reported on 6/20/2024), Disp: 9 tablet, Rfl: 0      Physical Exam:  There were no vitals taken for this visit.    Physical Exam  Vitals reviewed.   Constitutional:       General: She is not in acute distress.     Appearance: Normal appearance.   HENT:      Head: Normocephalic and atraumatic.      Nose: Nose normal.   Eyes:      Extraocular Movements: Extraocular movements intact.      Conjunctiva/sclera: Conjunctivae normal.   Cardiovascular:      Rate and Rhythm: Normal rate and regular rhythm.   Pulmonary:      Effort: Pulmonary effort is normal.      Breath sounds: Normal breath sounds.   Abdominal:      General: Abdomen is flat.      Palpations: There is no mass.   Musculoskeletal:         General: Normal range of motion.      Cervical back: Normal range of motion.       Comments: No LUE swelling, full ROM   Skin:     General: Skin is warm and dry.      Coloration: Skin is not pale.   Neurological:      General: No focal deficit present.      Mental Status: She is alert and oriented to person, place, and time.   Psychiatric:         Mood and Affect: Mood normal.           Labs:  Lab Results   Component Value Date    WBC 6.83 05/31/2024    HGB 14.6 05/31/2024    HCT 44.7 05/31/2024    MCV 95 05/31/2024     05/31/2024     Radiographic imaging:    Bilateral diagnostic mammogram 4/2024(performed by [facility] in [place])  Impression  Left breast upper outer quadrant round indistinct marginated mass    Left breast ultrasound 4/2024 (performed by [facility] in [place])   Echogenic at 1:00 5 cm from the nipple measuring 6 x 6 x 6 mm    Pathology:    Tissue biopsy status post lobectomy 6/7/2024:  Invasive mammary carcinoma of no special type VIII millimeters in greatest dimension with associated DCIS  Ancillary studies showing  and positive, [place] 95% negative, HER2 ICH 1+/low    Assessment and Plan:      64-year-old  female referred for left breast IDC stage Ia ER positive, W[place] positive, HER2 negative with associated DCIS status post lumpectomy with 0/4 sentinel lymph nodes negative and BRCA 1/2 negative genetic testing with Oncotype pending.    Left breast IDC Stage Ia ER positive, [place] positive, HER2 negative  -Status post lumpectomy 6/2024; BRCA1/2 negative   -Oncotype testing pending to determine if would need chemotherapy prior to radiation low suspicion   -Patient amenable to radiation will reach out with Dr. Aguilar  -Ordered DEXA to monitor bone health while on AI; pt will start taking Ca plus Vitamin D   -Consent signed for all 3 aromatase inhibitors, risk and benefits discussed and patient amenable to continuing therapy.(Possible plan for 5 years AI therapy, given low risk unclear benefits of extended therapy when compared to reoccurrence  rates)  -Follow-up in 3 months to evaluate how patient is tolerating AI in addition to reviewing DEXA  -Educated on health lifestyle and exercise       I have spent 40 minutes with Patient and family today in which greater than 50% of this time was spent in counseling/coordination of care regarding Diagnostic results, Prognosis, Patient and family education, and Counseling / Coordination of care.       Patient voiced understanding and agreement in the above discussion. Aware to contact our office with questions/symptoms in the interim.     This note has been generated by voice recognition software system.  Therefore, there may be spelling, grammar, and or syntax errors. Please contact if questions arise.

## 2024-07-09 NOTE — PATIENT INSTRUCTIONS
Patient to sign informed consent for all 3 aromatase inhibitors.  She will be called with the results of Oncotype to make sure she does not need chemotherapy and we will let radiation oncologist now.  In the meantime patient will have DEXA scan.  She will start taking 1 calcium with vitamin D and extra 2000 units of vitamin D3 daily.  Blood work prior to next visit in 3 months.

## 2024-07-11 ENCOUNTER — ANESTHESIA EVENT (OUTPATIENT)
Dept: GASTROENTEROLOGY | Facility: AMBULARY SURGERY CENTER | Age: 64
End: 2024-07-11

## 2024-07-11 ENCOUNTER — ANESTHESIA (OUTPATIENT)
Dept: GASTROENTEROLOGY | Facility: AMBULARY SURGERY CENTER | Age: 64
End: 2024-07-11

## 2024-07-11 ENCOUNTER — PATIENT OUTREACH (OUTPATIENT)
Dept: HEMATOLOGY ONCOLOGY | Facility: CLINIC | Age: 64
End: 2024-07-11

## 2024-07-11 ENCOUNTER — HOSPITAL ENCOUNTER (OUTPATIENT)
Dept: GASTROENTEROLOGY | Facility: AMBULARY SURGERY CENTER | Age: 64
Setting detail: OUTPATIENT SURGERY
End: 2024-07-11
Attending: INTERNAL MEDICINE
Payer: COMMERCIAL

## 2024-07-11 VITALS
SYSTOLIC BLOOD PRESSURE: 119 MMHG | RESPIRATION RATE: 18 BRPM | DIASTOLIC BLOOD PRESSURE: 57 MMHG | TEMPERATURE: 96.8 F | HEART RATE: 82 BPM | OXYGEN SATURATION: 99 %

## 2024-07-11 DIAGNOSIS — Z12.11 SCREENING FOR COLON CANCER: ICD-10-CM

## 2024-07-11 PROCEDURE — G0121 COLON CA SCRN NOT HI RSK IND: HCPCS | Performed by: INTERNAL MEDICINE

## 2024-07-11 RX ORDER — SODIUM CHLORIDE, SODIUM LACTATE, POTASSIUM CHLORIDE, CALCIUM CHLORIDE 600; 310; 30; 20 MG/100ML; MG/100ML; MG/100ML; MG/100ML
INJECTION, SOLUTION INTRAVENOUS CONTINUOUS PRN
Status: DISCONTINUED | OUTPATIENT
Start: 2024-07-11 | End: 2024-07-11 | Stop reason: HOSPADM

## 2024-07-11 RX ORDER — GLYCOPYRROLATE 0.2 MG/ML
INJECTION INTRAMUSCULAR; INTRAVENOUS AS NEEDED
Status: DISCONTINUED | OUTPATIENT
Start: 2024-07-11 | End: 2024-07-11 | Stop reason: HOSPADM

## 2024-07-11 RX ORDER — LIDOCAINE HYDROCHLORIDE 10 MG/ML
INJECTION, SOLUTION EPIDURAL; INFILTRATION; INTRACAUDAL; PERINEURAL AS NEEDED
Status: DISCONTINUED | OUTPATIENT
Start: 2024-07-11 | End: 2024-07-11 | Stop reason: HOSPADM

## 2024-07-11 RX ORDER — PROPOFOL 10 MG/ML
INJECTION, EMULSION INTRAVENOUS AS NEEDED
Status: DISCONTINUED | OUTPATIENT
Start: 2024-07-11 | End: 2024-07-11 | Stop reason: HOSPADM

## 2024-07-11 RX ADMIN — SODIUM CHLORIDE, SODIUM LACTATE, POTASSIUM CHLORIDE, AND CALCIUM CHLORIDE: .6; .31; .03; .02 INJECTION, SOLUTION INTRAVENOUS at 10:37

## 2024-07-11 RX ADMIN — PROPOFOL 100 MG: 10 INJECTION, EMULSION INTRAVENOUS at 10:49

## 2024-07-11 RX ADMIN — GLYCOPYRROLATE 0.2 MCG: 0.2 INJECTION, SOLUTION INTRAMUSCULAR; INTRAVENOUS at 10:54

## 2024-07-11 RX ADMIN — LIDOCAINE HYDROCHLORIDE 50 MG: 10 INJECTION, SOLUTION EPIDURAL; INFILTRATION; INTRACAUDAL; PERINEURAL at 10:49

## 2024-07-11 RX ADMIN — PROPOFOL 100 MG: 10 INJECTION, EMULSION INTRAVENOUS at 10:54

## 2024-07-11 NOTE — H&P
History and Physical - [facility]  Ines Aviles 64 y.o. female MRN: [id number]        HPI: 64-year-old female was referred for screening colonoscopy.  Regular bowel movements.    Historical Information   Past Medical History:   Diagnosis Date    Breast cancer (HCC)      Past Surgical History:   Procedure Laterality Date     SECTION      NV BX/EXC LYMPH NODE OPEN SUPERFICIAL Left 2024    Procedure: DARCIE  LOCALIZED LUMPECTOMY LEFT BREAST W/SLN BX, LYMPHOSCINTIGRAPHY,LYMPHATIC MAPPING;  Surgeon: Svetlana Anguiano MD;  [facility];  Service: Surgical Oncology    NV EXC BREAST LES PREOP PLMT RAD MARKER OPEN 1 LES Left 2024    Procedure: DARCIE  LOCALIZED LUMPECTOMY LEFT BREAST W/SLN BX, LYMPHOSCINTIGRAPHY,LYMPHATIC MAPPING;  Surgeon: Svetlana Anguiano MD;  [facility];  Service: Surgical Oncology    NV INTRAOP SENTINEL LYMPH NODE ID W/DYE INJECTION Left 2024    Procedure: DARCIE  LOCALIZED LUMPECTOMY LEFT BREAST W/SLN BX, LYMPHOSCINTIGRAPHY,LYMPHATIC MAPPING;  Surgeon: Svetlana Anguiano MD;  [facility];  Service: Surgical Oncology    NV MASTECTOMY PARTIAL Left 2024    Procedure: DARCIE  LOCALIZED LUMPECTOMY LEFT BREAST W/SLN BX, LYMPHOSCINTIGRAPHY,LYMPHATIC MAPPING;  Surgeon: Svetlana Anguiano MD;  [facility];  Service: Surgical Oncology    US BREAST NEEDLE LOC LEFT Left 2024     Social History   Social History     Substance and Sexual Activity   Alcohol Use Yes    Alcohol/week: 8.0 standard drinks of alcohol    Types: 8 Glasses of wine per week    Comment: 1-2 glass of wine nightly. last drank      Social History     Substance and Sexual Activity   Drug Use Never     Social History     Tobacco Use   Smoking Status Former    Current packs/day: 0.00    Average packs/day: 0.3 packs/day for 5.0 years (1.2 ttl pk-yrs)    Types: Cigarettes    Start date: 6/15/1979    Quit date: 1/15/1984    Years since quittin.5    Passive  exposure: Never   Smokeless Tobacco Never     Family History   Problem Relation Age of Onset    Diabetes Mother     Hypertension Mother     No Known Problems Father     No Known Problems Brother        Meds/Allergies     Not in a hospital admission.    Allergies   Allergen Reactions    Erythromycin GI Intolerance    Levaquin [Levofloxacin] Rash    Macrobid [Nitrofurantoin] Rash       Objective     Blood pressure 140/65, pulse 75, temperature (!) 96.8 °F (36 °C), temperature source Temporal, resp. rate 18, SpO2 99%.    Physical Exam:    Chest- CTA  Heart- RRR  Abdomen- NT/ND  Extremities- No edema    ASSESSMENT:     Screening for colon cancer    PLAN:    Colonoscopy

## 2024-07-11 NOTE — PROGRESS NOTES
Patient Navigation attempted to outreach patient to assess any questions or concerns regarding Medical and Radiation Oncology consults.  A voicemail was left stating a reason for my call and my contact information was provided . I requested a return call at patient's earliest convenience.

## 2024-07-11 NOTE — ANESTHESIA PREPROCEDURE EVALUATION
Procedure:  COLONOSCOPY    Relevant Problems   GYN   (+) Infiltrating ductal carcinoma of left breast (HCC)   (+) Malignant neoplasm of upper-outer quadrant of left breast in female, estrogen receptor positive (HCC)        Physical Exam    Airway    Mallampati score: II  TM Distance: >3 FB  Neck ROM: full     Dental   No notable dental hx     Cardiovascular  Cardiovascular exam normal    Pulmonary  Pulmonary exam normal     Other Findings  post-pubertal.      Anesthesia Plan  ASA Score- 2     Anesthesia Type- IV sedation with anesthesia with ASA Monitors.         Additional Monitors:     Airway Plan:            Plan Factors-Exercise tolerance (METS): >4 METS.    Chart reviewed.   Existing labs reviewed. Patient summary reviewed.    Patient is not a current smoker.              Induction-     Postoperative Plan-     Perioperative Resuscitation Plan - Level 1 - Full Code.       Informed Consent- Anesthetic plan and risks discussed with patient.  I personally reviewed this patient with the CRNA. Discussed and agreed on the Anesthesia Plan with the CRNA..

## 2024-07-11 NOTE — ANESTHESIA POSTPROCEDURE EVALUATION
Post-Op Assessment Note    CV Status:  Stable  Pain Score: 0    Pain management: adequate       Mental Status:  Alert and awake   Hydration Status:  Euvolemic   PONV Controlled:  Controlled   Airway Patency:  Patent     Post Op Vitals Reviewed: Yes    No anethesia notable event occurred.    Staff: CRNA               BP   94/52   Temp      Pulse  87   Resp   16   SpO2   96

## 2024-07-17 ENCOUNTER — LAB REQUISITION (OUTPATIENT)
Dept: LAB | Facility: HOSPITAL | Age: 64
End: 2024-07-17

## 2024-07-17 DIAGNOSIS — C50.912 MALIGNANT NEOPLASM OF UNSPECIFIED SITE OF LEFT FEMALE BREAST (HCC): ICD-10-CM

## 2024-07-18 LAB — SCAN RESULT: NORMAL

## 2024-07-19 ENCOUNTER — APPOINTMENT (OUTPATIENT)
Dept: RADIATION ONCOLOGY | Facility: HOSPITAL | Age: 64
End: 2024-07-19
Attending: STUDENT IN AN ORGANIZED HEALTH CARE EDUCATION/TRAINING PROGRAM

## 2024-07-19 ENCOUNTER — TELEPHONE (OUTPATIENT)
Dept: OTHER | Facility: HOSPITAL | Age: 64
End: 2024-07-19

## 2024-07-19 NOTE — TELEPHONE ENCOUNTER
Clinical Research Coordinator spoke with pt regarding potential interest in NRG  clinical trial, first introduced during her consult with Dr. Aguilar on 7/03/2024. Pt remained interested and coordinated for review of the informed consent form on 7/23/2024 at the [facility] at 9 am. Pt aware of my contact info should she have any new questions or need to reschedule.

## 2024-07-23 ENCOUNTER — DOCUMENTATION (OUTPATIENT)
Dept: OTHER | Facility: HOSPITAL | Age: 64
End: 2024-07-23

## 2024-07-23 NOTE — PROGRESS NOTES
Clinical Research Coordinator met with patient and her  at the [facility] Onc office for discussion of the NR  clinical trial and review of informed consent form. . Clinical Research Coordinator discussed the trial, including the risks, benefits, and alternatives. Pt had the opportunity to ask questions, and all were answered to their satisfaction. Patient expressed interest in participating, but wanted to review the schedule of visits and treatments for both radiation and hormonal therapy with her insurance before proceeding with consent to study. Pt was sent home with a copy of the consent form, the schedule of activities calendar, and my business card should any questions arise in the meantime. Pt's insurance also requested CPT codes for both treatments, which will be obtained.

## 2024-07-24 ENCOUNTER — TELEPHONE (OUTPATIENT)
Dept: OTHER | Facility: HOSPITAL | Age: 64
End: 2024-07-24

## 2024-07-24 NOTE — TELEPHONE ENCOUNTER
Clinical Research Coordinator spoke with patient to confirm CPT billing codes for review with [employer]; relayed to patient that Radiation oncology billing did review these with [employer] and that no prior authorizations were needed. Pt agreeable to return to the Rad Onc office at 11 am at [facility] on 7/25/2024 to sign and re-review the informed consent form.

## 2024-07-25 ENCOUNTER — DOCUMENTATION (OUTPATIENT)
Dept: OTHER | Facility: HOSPITAL | Age: 64
End: 2024-07-25

## 2024-07-25 NOTE — PROGRESS NOTES
Patient returned to the [facility] for review of the NRG  informed consent form. Pt was first introduced to the study during her consult with Dr. Aguilar on 7/3/2024 and the trial was further discussed today after she was able to confirm her insurance coverage of possible treatment options (RT + HT, v HT alone). Clinical Research Coordinator discussed the trial, including the risks, benefits, and alternatives. Pt had the opportunity to ask questions, and all were answered to their satisfaction. Pt remained interested in participating and signed the informed consent and HIPAA authorization form, also signed by the Clinical Research Coordinator. Pt consented to all optional biospecimen collection and biobanking, as well as to be contacted for future research studies. Pt is now consented in screening and aware that first optional blood collection will occur prior to treatment start.

## 2024-07-25 NOTE — PROGRESS NOTES
Documentation of Informed Consent Process for Clinical Research Study    Study Title: NRG   Patient Name: Ines Aviles  YOB: 1960    This signed and dated document shall serve as certification that all of the below listed required elements of informed consent were provided to the subject or legally authorized representative signing the actual Informed Consent Document, both in written and verbal format.     The HIPAA consent is contained within the Informed Consent document, and has also been discussed.    A copy of the actual signed and dated Informed Consent has also been provided to the subject or legally authorized representative, and the original signed document shall be maintained in the research shadow chart.    Element of Informed Consent Discussed Date Initials/ Person obtaining consent   A statement that the study involves research, an explanation of the purposes of the research and the expected duration of the subject's participation, a description of the procedures to be followed, and identification of any procedures which are experimental 7/25/2024 HM   A description of any reasonably foreseeable risks or discomforts to the subject. 7/25/2024 HM   A description of any benefits to the subject or to others which may reasonably be expected from the research. 7/25/2024 HM   A disclosure of appropriate alternative procedures or courses of treatment, if any, that might be advantageous to the subject. 7/25/2024 HM   A statement describing the extent, if any, to which confidentiality of records identifying the subject will be maintained and that notes the possibility that the Food and Drug Administration may inspect the records 7/25/2024 HM   For research involving more than minimal risk, an explanation as to whether any compensation and an explanation as to whether any medical treatments are available if injury occurs and, if so, what they consist of, or where further information may  be obtained. 7/25/2024    An explanation of whom to contact for answers to pertinent questions about the research and research subjects' rights, and whom to contact in the event of a research-related injury to the subject. 7/25/2024    A statement that participation is voluntary, that refusal to participate will involve no penalty or loss of benefits to which the subject is otherwise entitled, and that the subject may discontinue participation at any time without penalty or loss of benefits to which the subject is otherwise entitled. 7/25/2024    A statement that the particular treatment or procedure may involve risks to the subject (or to the embryo or fetus, if the subject is or may become pregnant) which are currently unforeseeable 7/25/2024    Anticipated circumstances under which the subject's participation may be terminated by the investigator without regard to the subject's consent. 7/25/2024    Any additional costs to the subject that may result from participation in the research 7/25/2024    The consequences of a subjects' decision to withdraw from the research and procedures for orderly termination of participation by the subject. 7/25/2024    A statement that significant new findings developed during the course of the research which may relate to the subject's willingness to continue participation will be provided to the subject. 7/25/2024 HM   The approximate number of subjects involved in the study. 7/25/2024 HM   The patient speaks, reads and understands English?  x Y q N   If NO, Name of :___________________________________   speaks, reads, and understands English?     q Y q N   Process utilized to obtain consent:_______________________________________________   Subject meets eligibility criteria as outline in the current protocol?   x Y q N    q N/A - Reason: ___________________________________________________________  The protocol consent was reviewed with the  patient and all questions were addressed and answered?    x Y q N  The Informed Consent Teach-back section was reviewed with the subject and all questions and/or lack of understanding were addressed?    jacob Y q N  The subject agreed to participate and the consent was signed and dated?    Devi mcintosh N  Consent was obtained prior to any research procedures being performed?    jacob mcintosh N  Date & Time ICF signed ____7/25/2024 10:15 am___________________________  Were any recruitment materials or advertisements used/discussed with the subject?   q Y x N

## 2024-07-26 ENCOUNTER — PATIENT OUTREACH (OUTPATIENT)
Dept: HEMATOLOGY ONCOLOGY | Facility: CLINIC | Age: 64
End: 2024-07-26

## 2024-07-26 ENCOUNTER — DOCUMENTATION (OUTPATIENT)
Dept: OTHER | Facility: HOSPITAL | Age: 64
End: 2024-07-26

## 2024-07-26 NOTE — PROGRESS NOTES
Patient was enrolled to Step 1 of [facility]  clinical trial, ID# --[id number], following confirmation of eligibility; checklist signed today by Dr. Aguilar.

## 2024-07-26 NOTE — PROGRESS NOTES
Patient Navigation    I reached out and spoke with Inse now that consults have been completed with the oncology teams to review for any barriers to care and offer supportive services as needed. I reviewed and updated the members assigned to the care team in [facility].   She knows the members of the care team as well as how and when to contact them with any needs.   She verbalizes managing the schedules well.   She is currently able to drive and denies any transportation needs.    She denies any uncontrolled symptoms. Discussed role of Palliative Care in symptom and side effect management. Declined referral at this time.  Patients states that she is eating and drinking as per usual with no unintentional weight loss.     Patient does not smoke.   Patient states she is well supported by family and friends.    Patient feels she has adequate insurance coverage and denies any financial concerns at this time.     Based on their individual needs I will follow up with them in about 4-6 weeks.   I have provided my direct contact information and welcome them to contact me if their needs as discussed above change. They were appreciative for the call.

## 2024-07-31 ENCOUNTER — DOCUMENTATION (OUTPATIENT)
Dept: OTHER | Facility: HOSPITAL | Age: 64
End: 2024-07-31

## 2024-07-31 NOTE — PROGRESS NOTES
Clinical Research Coordinator enrolled patient in [facility] website to step 2 of NRG  clinical trial, ID# --[id number]. Pt randomized to arm 1, to receive RT + HT. Pt made aware and appropriate members of the patient's cancer treatment team have been notified via email. Patient will be scheduled to continue with Rad/Onc now that she is confirmed to receive RT and will coordinate to obtain optional blood collection prior to treatment start date.

## 2024-08-01 ENCOUNTER — TELEPHONE (OUTPATIENT)
Dept: OTHER | Facility: HOSPITAL | Age: 64
End: 2024-08-01

## 2024-08-01 NOTE — TELEPHONE ENCOUNTER
Clinical Research Coordinator regarding scheduling of optional blood collection as a part of the [facility]  clinical trial. Pt agreeable to obtain pre-treatment labs on 8/2/2024 at the [facility] at 1 pm.

## 2024-08-02 ENCOUNTER — DOCUMENTATION (OUTPATIENT)
Dept: OTHER | Facility: HOSPITAL | Age: 64
End: 2024-08-02

## 2024-08-02 NOTE — PROGRESS NOTES
Pt presented today for collection of pre-treatment labs as a part of the [facility]  clinical trial, pt ID#--[id number]. Labs were drawn by Clinical Research Nurse Juliet Pearson and Clinical Research Associate Khloe Feliz in the clinical trials lab space at [facility]. Pt is next scheduled for CT SIM on 8/5/2024, and aware that next optional labs will be due following the end of RT.

## 2024-08-05 ENCOUNTER — APPOINTMENT (OUTPATIENT)
Dept: RADIATION ONCOLOGY | Facility: HOSPITAL | Age: 64
End: 2024-08-05
Attending: STUDENT IN AN ORGANIZED HEALTH CARE EDUCATION/TRAINING PROGRAM
Payer: COMMERCIAL

## 2024-08-05 PROCEDURE — 77290 THER RAD SIMULAJ FIELD CPLX: CPT | Performed by: STUDENT IN AN ORGANIZED HEALTH CARE EDUCATION/TRAINING PROGRAM

## 2024-08-05 PROCEDURE — 77332 RADIATION TREATMENT AID(S): CPT | Performed by: STUDENT IN AN ORGANIZED HEALTH CARE EDUCATION/TRAINING PROGRAM

## 2024-08-08 ENCOUNTER — APPOINTMENT (OUTPATIENT)
Dept: RADIATION ONCOLOGY | Facility: CLINIC | Age: 64
End: 2024-08-08
Payer: COMMERCIAL

## 2024-08-08 PROCEDURE — 77295 3-D RADIOTHERAPY PLAN: CPT | Performed by: STUDENT IN AN ORGANIZED HEALTH CARE EDUCATION/TRAINING PROGRAM

## 2024-08-08 PROCEDURE — 77300 RADIATION THERAPY DOSE PLAN: CPT | Performed by: STUDENT IN AN ORGANIZED HEALTH CARE EDUCATION/TRAINING PROGRAM

## 2024-08-08 PROCEDURE — 77334 RADIATION TREATMENT AID(S): CPT | Performed by: STUDENT IN AN ORGANIZED HEALTH CARE EDUCATION/TRAINING PROGRAM

## 2024-08-14 ENCOUNTER — APPOINTMENT (OUTPATIENT)
Dept: RADIATION ONCOLOGY | Facility: CLINIC | Age: 64
End: 2024-08-14
Attending: STUDENT IN AN ORGANIZED HEALTH CARE EDUCATION/TRAINING PROGRAM
Payer: COMMERCIAL

## 2024-08-14 PROCEDURE — 77412 RADIATION TX DELIVERY LVL 3: CPT | Performed by: INTERNAL MEDICINE

## 2024-08-14 PROCEDURE — 77280 THER RAD SIMULAJ FIELD SMPL: CPT | Performed by: INTERNAL MEDICINE

## 2024-08-14 PROCEDURE — 77427 RADIATION TX MANAGEMENT X5: CPT | Performed by: RADIOLOGY

## 2024-08-14 PROCEDURE — 77387 GUIDANCE FOR RADJ TX DLVR: CPT | Performed by: INTERNAL MEDICINE

## 2024-08-15 ENCOUNTER — APPOINTMENT (OUTPATIENT)
Dept: RADIATION ONCOLOGY | Facility: CLINIC | Age: 64
End: 2024-08-15

## 2024-08-16 ENCOUNTER — APPOINTMENT (OUTPATIENT)
Dept: RADIATION ONCOLOGY | Facility: CLINIC | Age: 64
End: 2024-08-16
Attending: STUDENT IN AN ORGANIZED HEALTH CARE EDUCATION/TRAINING PROGRAM
Payer: COMMERCIAL

## 2024-08-16 PROCEDURE — 77412 RADIATION TX DELIVERY LVL 3: CPT | Performed by: RADIOLOGY

## 2024-08-16 PROCEDURE — G6017 INTRAFRACTION TRACK MOTION: HCPCS | Performed by: RADIOLOGY

## 2024-08-16 PROCEDURE — 77387 GUIDANCE FOR RADJ TX DLVR: CPT | Performed by: RADIOLOGY

## 2024-08-19 ENCOUNTER — APPOINTMENT (OUTPATIENT)
Dept: RADIATION ONCOLOGY | Facility: CLINIC | Age: 64
End: 2024-08-19
Attending: STUDENT IN AN ORGANIZED HEALTH CARE EDUCATION/TRAINING PROGRAM
Payer: COMMERCIAL

## 2024-08-19 PROCEDURE — 77387 GUIDANCE FOR RADJ TX DLVR: CPT | Performed by: RADIOLOGY

## 2024-08-19 PROCEDURE — 77331 SPECIAL RADIATION DOSIMETRY: CPT | Performed by: RADIOLOGY

## 2024-08-19 PROCEDURE — 77412 RADIATION TX DELIVERY LVL 3: CPT | Performed by: RADIOLOGY

## 2024-08-21 ENCOUNTER — APPOINTMENT (OUTPATIENT)
Dept: RADIATION ONCOLOGY | Facility: CLINIC | Age: 64
End: 2024-08-21
Attending: STUDENT IN AN ORGANIZED HEALTH CARE EDUCATION/TRAINING PROGRAM
Payer: COMMERCIAL

## 2024-08-21 PROCEDURE — 77412 RADIATION TX DELIVERY LVL 3: CPT | Performed by: RADIOLOGY

## 2024-08-21 PROCEDURE — 77387 GUIDANCE FOR RADJ TX DLVR: CPT | Performed by: RADIOLOGY

## 2024-08-23 ENCOUNTER — APPOINTMENT (OUTPATIENT)
Dept: RADIATION ONCOLOGY | Facility: CLINIC | Age: 64
End: 2024-08-23
Attending: STUDENT IN AN ORGANIZED HEALTH CARE EDUCATION/TRAINING PROGRAM
Payer: COMMERCIAL

## 2024-08-23 PROCEDURE — 77336 RADIATION PHYSICS CONSULT: CPT | Performed by: RADIOLOGY

## 2024-08-23 PROCEDURE — 77387 GUIDANCE FOR RADJ TX DLVR: CPT | Performed by: RADIOLOGY

## 2024-08-23 PROCEDURE — 77412 RADIATION TX DELIVERY LVL 3: CPT | Performed by: RADIOLOGY

## 2024-09-03 ENCOUNTER — PATIENT OUTREACH (OUTPATIENT)
Dept: HEMATOLOGY ONCOLOGY | Facility: CLINIC | Age: 64
End: 2024-09-03

## 2024-09-03 NOTE — PROGRESS NOTES
I reached out and spoke to Ines to follow up with them and to review for any changes in barriers to care and offer supportive services as needed.    Barriers noted previously: N/A    Current barriers and interventions provided: N/A      This patient will no longer require follow up.  I have provided care team contact information to the patient and welcome them to contact me if their needs as discussed above change.  Patient is currently not interested in scheduling an appointment for Survivorship and is aware who to contact if her needs change. Patient was appreciative of the phone call.

## 2024-09-04 ENCOUNTER — TELEPHONE (OUTPATIENT)
Dept: OTHER | Facility: HOSPITAL | Age: 64
End: 2024-09-04

## 2024-09-04 NOTE — TELEPHONE ENCOUNTER
Clinical Research Coordinator returned call from pt to confirm next required study visit and optional lab collection following her completion of RT. Pt participant on  clinical trial ID#--[id number]. Pt will be seen by Dr. Aguilar at the [facility] on 9/20/2024 and labs will be collected at this time. PT aware that she will next be due for 6 mo f/u at this time.

## 2024-09-10 ENCOUNTER — DOCUMENTATION (OUTPATIENT)
Dept: HEMATOLOGY ONCOLOGY | Facility: CLINIC | Age: 64
End: 2024-09-10

## 2024-09-10 DIAGNOSIS — C50.412 MALIGNANT NEOPLASM OF UPPER-OUTER QUADRANT OF LEFT BREAST IN FEMALE, ESTROGEN RECEPTOR POSITIVE (HCC): Primary | ICD-10-CM

## 2024-09-10 DIAGNOSIS — Z17.0 MALIGNANT NEOPLASM OF UPPER-OUTER QUADRANT OF LEFT BREAST IN FEMALE, ESTROGEN RECEPTOR POSITIVE (HCC): Primary | ICD-10-CM

## 2024-09-10 RX ORDER — LETROZOLE 2.5 MG/1
2.5 TABLET, FILM COATED ORAL DAILY
Qty: 90 TABLET | Refills: 1 | Status: SHIPPED | OUTPATIENT
Start: 2024-09-10

## 2024-09-10 NOTE — PROGRESS NOTES
Spoke with patient to advise on the letrozole  She will start medication  Script pended to Dr. Gilliam for signature

## 2024-09-19 ENCOUNTER — TELEPHONE (OUTPATIENT)
Dept: OTHER | Facility: HOSPITAL | Age: 64
End: 2024-09-19

## 2024-09-19 ENCOUNTER — TELEPHONE (OUTPATIENT)
Age: 64
End: 2024-09-19

## 2024-09-19 NOTE — TELEPHONE ENCOUNTER
Clinical Research Coordinator received callback from pt regarding visit on 9/20 with Dr. Aguilar. Pt wanted to confirm her positive COVID status did not impede clinical trial involvement; pt aware that optional lab collection will resume at this visit and that per MA call with Alicia she is ok to come in masked.

## 2024-09-19 NOTE — TELEPHONE ENCOUNTER
I called patient to inform her it was ok to keep her appt for tomorrow with Dr. Aguilar.  Patient tested positive for Covid yesterday.  She has no fever and feels fine. I informed her to wear a mask.

## 2024-09-20 ENCOUNTER — DOCUMENTATION (OUTPATIENT)
Dept: OTHER | Facility: HOSPITAL | Age: 64
End: 2024-09-20

## 2024-09-20 ENCOUNTER — OFFICE VISIT (OUTPATIENT)
Dept: RADIATION ONCOLOGY | Facility: HOSPITAL | Age: 64
End: 2024-09-20
Attending: STUDENT IN AN ORGANIZED HEALTH CARE EDUCATION/TRAINING PROGRAM
Payer: COMMERCIAL

## 2024-09-20 VITALS
HEIGHT: 65 IN | DIASTOLIC BLOOD PRESSURE: 74 MMHG | BODY MASS INDEX: 23.82 KG/M2 | OXYGEN SATURATION: 99 % | SYSTOLIC BLOOD PRESSURE: 105 MMHG | TEMPERATURE: 98.9 F | WEIGHT: 143 LBS

## 2024-09-20 DIAGNOSIS — C50.912 INFILTRATING DUCTAL CARCINOMA OF LEFT BREAST (HCC): Primary | ICD-10-CM

## 2024-09-20 PROCEDURE — 99024 POSTOP FOLLOW-UP VISIT: CPT | Performed by: STUDENT IN AN ORGANIZED HEALTH CARE EDUCATION/TRAINING PROGRAM

## 2024-09-20 PROCEDURE — 99211 OFF/OP EST MAY X REQ PHY/QHP: CPT | Performed by: STUDENT IN AN ORGANIZED HEALTH CARE EDUCATION/TRAINING PROGRAM

## 2024-09-20 NOTE — PROGRESS NOTES
Ines Aviles 1960 is a 64 y.o. female with newly diagnosed carcinoma of the left breast.  She was asymptomatic referable to the breast.  She had an outside abnormal mammogram done in [place].  She had a biopsy with them as well, revealing invasive ductal carcinoma, grade 1, , MT 95, HER 2 1+, Ki-67 3. Genetic testing was negative. On 6/7/23 she underwent left breast joseph  localized lumpectomy woth SLN biopsy, with Dr. Anguiano. Pathology: Invasive ductal carcinoma of no special type (ductal), grade 1, 8 mm, margins negative, 0/4 lymph nodes.  On 8/23/24 she completed a course of adjuvant partial breast RT to a dose of 3000cGy in 5 fractions delivered EOD. She was recently advised by hem onc to initiate letrozole.The pt returns today for her EOT follow up visit.       Upcoming:  10/09/24 - Hem OncTawana  01/16/25 - Surg Onc, Chris      Follow up visit     Oncology History   Infiltrating ductal carcinoma of left breast (HCC)   5/1/2024 Biopsy    Left breast ultrasound-guided biopsy ([place], [place])  1 o'clock, 4-5 cm from nipple (ribbon)  Invasive ductal carcinoma  Grade 1  , MT 95, HER2 1+, Ki-67 3  One focus suspicious for lymphovascular invasion    Concordant. Malignancy appears unifocal; measures up to 6 mm. No left axillary adenopathy. Right breast clear.     5/1/2024 -  Cancer Staged    Staging form: Breast, AJCC 8th Edition  - Clinical stage from 5/1/2024: Stage IA (cT1, cN0, cM0, G1, ER+, MT+, HER2-) - Signed by Svetlana Anguiano MD on 5/20/2024  Stage prefix: Initial diagnosis  Method of lymph node assessment: Clinical  Histologic grading system: 3 grade system       5/13/2024 Genetic Testing    NEGATIVE: No Clinically Significant Variants Detected  A total of 59 genes were evaluated with RNA insight: APC, FREDA, BAP1, BARD1, BMPR1A, BRCA1, BRCA2, BRIP1, CDH1, CDK4, CDKN1B, CDKN2A, CHEK2, DICER1, FH, FLCN, KIF1B, MAX, MEN1, MET, MLH1, MSH2, MSH6, MUTYH, NF1, NTHL1, PALB2, PMS2,  POT1, PTEN, RAD51C, RAD51D, RB1, RET, SDHA, SDHAF2, SDHB, SDHC, SDHD, SMAD4, SMARCA4, STK11, WUDH172, TP53, TSC1, TSC2 and VHL (sequencing and deletion/duplication); AXIN2, CTNNA1, EGLN1, HOXB13, KIT, MITF, MSH3, PDGFRA, POLD1 and POLE (sequencing only); EPCAM and GREM1 (deletion/duplication only).  [place]     6/7/2024 Surgery    Left breast DARCIE  directed lumpectomy with SLN biopsy  Invasive carcinoma of no special type (ductal)  Grade 1  8 mm  Margins negative  0/4 lymph nodes     6/7/2024 -  Cancer Staged    Staging form: Breast, AJCC 8th Edition  - Pathologic stage from 6/7/2024: pT1b, pN0(sn), cM0, G2, ER+, ND+ - Signed by Svetlana Anguiano MD on 6/20/2024  Stage prefix: Initial diagnosis  Method of lymph node assessment: [place] lymph node biopsy  Histologic grading system: 3 grade system       8/14/2024 - 8/23/2024 Radiation    Treatments:  Course: C1    Plan ID Energy Fractions Dose per Fraction (cGy) Dose Correction (cGy) Total Dose Delivered (cGy) Elapsed Days   BH L APBI 6X 5 / 5 600 0 3,000 9      Treatment dates:  8/14/2024 - 8/23/2024          Review of Systems:  Review of Systems   Constitutional: Negative.    Gastrointestinal: Negative.    Skin:         9/20/24-Patient denies swelling, tenderness or discharge. ROM is good.       Clinical Trial: no    Pregnancy test needed:  not applicable    Teaching yes    Health Maintenance   Topic Date Due    Pneumococcal Vaccine: Pediatrics (0 to 5 Years) and At-Risk Patients (6 to 64 Years) (1 of 2 - PCV) Never done    HIV Screening  Never done    Zoster Vaccine (1 of 2) Never done    Cervical Cancer Screening  Never done    Osteoporosis Screening  Never done    RSV Vaccine Age 60+ Years (1 - 1-dose 60+ series) Never done    COVID-19 Vaccine (3 - Pfizer risk series) 12/21/2021    Influenza Vaccine (1) 09/01/2024    Annual Physical  04/04/2025    Breast Cancer Screening: Mammogram  04/22/2025    BMI: Adult  07/09/2025    Depression Screening  09/20/2025     DTaP,Tdap,and Td Vaccines (2 - Td or Tdap) 2029    Colorectal Cancer Screening  2034    Hepatitis C Screening  Completed    RSV Vaccine age 0-20 Months  Aged Out    HIB Vaccine  Aged Out    IPV Vaccine  Aged Out    Hepatitis A Vaccine  Aged Out    Meningococcal ACWY Vaccine  Aged Out    HPV Vaccine  Aged Out     Patient Active Problem List   Diagnosis    Infiltrating ductal carcinoma of left breast (HCC)    BRCA gene mutation negative    Malignant neoplasm of upper-outer quadrant of left breast in female, estrogen receptor positive (HCC)    Drug-induced osteoporosis     Past Medical History:   Diagnosis Date    Breast cancer (HCC)      Past Surgical History:   Procedure Laterality Date     SECTION      [place] BX/EXC LYMPH NODE OPEN SUPERFICIAL Left 2024    Procedure: DARCIE  LOCALIZED LUMPECTOMY LEFT BREAST W/SLN BX, LYMPHOSCINTIGRAPHY,LYMPHATIC MAPPING;  Surgeon: Svetlana Anguiano MD;  [facility];  Service: Surgical Oncology    WI EXC BREAST LES PREOP PLMT RAD MARKER OPEN 1 LES Left 2024    Procedure: DARCIE  LOCALIZED LUMPECTOMY LEFT BREAST W/SLN BX, LYMPHOSCINTIGRAPHY,LYMPHATIC MAPPING;  Surgeon: Svetlana Anguiano MD;  [facility];  Service: Surgical Oncology    WI INTRAOP SENTINEL LYMPH NODE ID W/DYE INJECTION Left 2024    Procedure: DARCIE  LOCALIZED LUMPECTOMY LEFT BREAST W/SLN BX, LYMPHOSCINTIGRAPHY,LYMPHATIC MAPPING;  Surgeon: Svetlana Anguiano MD;  [facility];  Service: Surgical Oncology    WI MASTECTOMY PARTIAL Left 2024    Procedure: DARCIE  LOCALIZED LUMPECTOMY LEFT BREAST W/SLN BX, LYMPHOSCINTIGRAPHY,LYMPHATIC MAPPING;  Surgeon: Svetlana Anguiano MD;  [facility];  Service: Surgical Oncology    US BREAST NEEDLE LOC LEFT Left 2024     Family History   Problem Relation Age of Onset    Diabetes Mother     Hypertension Mother     No Known Problems Father     No Known Problems Brother      Social History     Socioeconomic History     "Marital status: /Civil Union     Spouse name: Not on file    Number of children: Not on file    Years of education: Not on file    Highest education level: Not on file   Occupational History    Not on file   Tobacco Use    Smoking status: Former     Current packs/day: 0.00     Average packs/day: 0.3 packs/day for 5.0 years (1.2 ttl pk-yrs)     Types: Cigarettes     Start date: 6/15/1979     Quit date: 1/15/1984     Years since quittin.7     Passive exposure: Never    Smokeless tobacco: Never   Vaping Use    Vaping status: Never Used   Substance and Sexual Activity    Alcohol use: Yes     Alcohol/week: 8.0 standard drinks of alcohol     Types: 8 Glasses of wine per week     Comment: 1-2 glass of wine nightly. last drank     Drug use: Never    Sexual activity: Not Currently   Other Topics Concern    Not on file   Social History Narrative    Not on file     Social Determinants of Health     Financial Resource Strain: Not on file   Food Insecurity: Not on file   Transportation Needs: Not on file   Physical Activity: Not on file   Stress: Not on file   Social Connections: Not on file   Intimate Partner Violence: Not on file   Housing Stability: Not on file       Current Outpatient Medications:     letrozole (FEMARA) 2.5 mg tablet, Take 1 tablet (2.5 mg total) by mouth daily, Disp: 90 tablet, Rfl: 1  Allergies   Allergen Reactions    Erythromycin GI Intolerance    Levaquin [Levofloxacin] Rash    Macrobid [Nitrofurantoin] Rash     Vitals:    24 1040   BP: 105/74   BP Location: Left arm   Patient Position: Sitting   Cuff Size: Large   Temp: 98.9 °F (37.2 °C)   TempSrc: Temporal   SpO2: 99%   Weight: 64.9 kg (143 lb)   Height: 5' 5\" (1.651 m)         "

## 2024-09-20 NOTE — PROGRESS NOTES
Clinical Research Coordinator met with patient for her end of RT visit on NR  trial, --[id number]. Pt seen by Dr. Aguilar in the Rad Onc office at the [facility], breast and physical exam performed. Pt reports no adverse events or side effects from treatment. Conmeds reviewed, pt recently started HT (Letrozole) without issue thus far. Attempt to obtain optional bio specimen collection was unsuccessful by DICKSON Enriquez. Prior to appointment, pt shared that nursing home had just called conveying a medical emergency with her father; reassurance given. Pt made aware that  study schedule will now continue with follow up visits at every 6 month interval; scheduled to continue with Dr. Aguilar on 1/27/2025 and will next meet with Dr. Gilliam for 3 mo follow-up on 10/9/2024. Pt made aware that next optional blood draw not due until 12 months post-randomization. PT also informed of Juliet's eventual transition as lead to [facility]- .

## 2024-09-30 NOTE — PROGRESS NOTES
Follow-up - Radiation Oncology   Ines Aviles 1960 64 y.o. female [id number]      History of Present Illness   Cancer Staging   Infiltrating ductal carcinoma of left breast (HCC)  Staging form: Breast, AJCC 8th Edition  - Clinical stage from 5/1/2024: Stage IA (cT1, cN0, cM0, G1, ER+, DC+, HER2-) - Signed by Svetlana Anguiano MD on 5/20/2024  Stage prefix: Initial diagnosis  Method of lymph node assessment: Clinical  Histologic grading system: 3 grade system  - Pathologic stage from 6/7/2024: pT1b, pN0(sn), cM0, G2, ER+, DC+ - Signed by Svetlana Anguiano MD on 6/20/2024  Stage prefix: Initial diagnosis  Method of lymph node assessment: [place] lymph node biopsy  Histologic grading system: 3 grade system      Ms. Ines Aviles is a 64 year old woman with Stage IA (pT1bN0) IDC of the left breast s/p lumpectomy/SLNBx. Pathology demonstrated a 8mm G1 primary, DCIS+ without EIC, LVSI-, 0/4 SLNs involved, margins-, ER+/DC+/Her2-, Ki67 3%. Oncotype was low risk. She enrolled in [facility]  and was randomized to adjuvant RT. On 8/23/24 she completed a course of adjuvant partial breast RT to a dose of 3000cGy in 5 fractions delivered EOD. She returns today for follow-up.     Interval History:   The patient was last seen at the completion of RT. He tolerated treatment well overall without substantial acute side effects.     Since completion of RT the patient has been doing well overall. She has proceeded with hormonal therapy with letrozole. She has follow-up scheduled with medical oncology and breast surgery.     Currently she is feeling well overall. She has suffered virtually no ill effect from prior RT. She has had no breast swelling, no tenderness, no skin changes. She has no UE swelling and no decreased ROM. No cough or SOB.         Historical Information   Oncology History   Infiltrating ductal carcinoma of left breast (HCC)   5/1/2024 Biopsy    Left breast ultrasound-guided biopsy ([place], [place])  1 o'clock, 4-5  cm from nipple (ribbon)  Invasive ductal carcinoma  Grade 1  , VT 95, HER2 1+, Ki-67 3  One focus suspicious for lymphovascular invasion    Concordant. Malignancy appears unifocal; measures up to 6 mm. No left axillary adenopathy. Right breast clear.     5/1/2024 -  Cancer Staged    Staging form: Breast, AJCC 8th Edition  - Clinical stage from 5/1/2024: Stage IA (cT1, cN0, cM0, G1, ER+, VT+, HER2-) - Signed by Svetlana Anguiano MD on 5/20/2024  Stage prefix: Initial diagnosis  Method of lymph node assessment: Clinical  Histologic grading system: 3 grade system       5/13/2024 Genetic Testing    NEGATIVE: No Clinically Significant Variants Detected  A total of 59 genes were evaluated with RNA insight: APC, FREDA, BAP1, BARD1, BMPR1A, BRCA1, BRCA2, BRIP1, CDH1, CDK4, CDKN1B, CDKN2A, CHEK2, DICER1, FH, FLCN, KIF1B, MAX, MEN1, MET, MLH1, MSH2, MSH6, MUTYH, NF1, NTHL1, PALB2, PMS2, POT1, PTEN, RAD51C, RAD51D, RB1, RET, SDHA, SDHAF2, SDHB, SDHC, SDHD, SMAD4, SMARCA4, STK11, HQWM313, TP53, TSC1, TSC2 and VHL (sequencing and deletion/duplication); AXIN2, CTNNA1, EGLN1, HOXB13, KIT, MITF, MSH3, PDGFRA, POLD1 and POLE (sequencing only); EPCAM and GREM1 (deletion/duplication only).  [place]     6/7/2024 Surgery    Left breast DARCIE  directed lumpectomy with SLN biopsy  Invasive carcinoma of no special type (ductal)  Grade 1  8 mm  Margins negative  0/4 lymph nodes     6/7/2024 -  Cancer Staged    Staging form: Breast, AJCC 8th Edition  - Pathologic stage from 6/7/2024: pT1b, pN0(sn), cM0, G2, ER+, VT+ - Signed by Svetlana Anguiano MD on 6/20/2024  Stage prefix: Initial diagnosis  Method of lymph node assessment: [place] lymph node biopsy  Histologic grading system: 3 grade system       8/14/2024 - 8/23/2024 Radiation    Treatments:  Course: C1    Plan ID Energy Fractions Dose per Fraction (cGy) Dose Correction (cGy) Total Dose Delivered (cGy) Elapsed Days    L APBI 6X 5 / 5 600 0 3,000 9      Treatment dates:  8/14/2024 -  "8/23/2024            OBJECTIVE:   /74 (BP Location: Left arm, Patient Position: Sitting, Cuff Size: Large)   Temp 98.9 °F (37.2 °C) (Temporal)   Ht 5' 5\" (1.651 m)   Wt 64.9 kg (143 lb)   SpO2 99%   BMI 23.80 kg/m²   Pain Assessment:  0  ECOG/Zubrod/WHO: 0 - Asymptomatic    Physical Exam   Well appearing. NAD.   No increased work of breathing. Lungs CTA bilaterally.   RRR. No murmurs.   Breasts symmetric in shape and contour. No residual skin changes. No masses appreciated in breast apart from expected post-surgical scarring. No axillary adenopathy. Chaperone present for this exam.   No UE edema. Full ROM in bilateral shoulders.   No skin changes.         Assessment/Plan:  No orders of the defined types were placed in this encounter.     Approximately 1 month following completion of RT the patient is doing well overall. She has recovered well from prior APBI without appreciable adverse effect. She has started AI therapy as planned. I will plan to see her back per NRG  schedule in 4 months.     Pato Aguilar MD  9/29/2024,9:14 PM    Portions of the record may have been created with voice recognition software.  Occasional wrong word or \"sound a like\" substitutions may have occurred due to the inherent limitations of voice recognition software.  Read the chart carefully and recognize, using context, where substitutions have occurred.        "

## 2024-10-01 ENCOUNTER — APPOINTMENT (OUTPATIENT)
Dept: LAB | Facility: CLINIC | Age: 64
End: 2024-10-01
Payer: COMMERCIAL

## 2024-10-01 DIAGNOSIS — C50.412 MALIGNANT NEOPLASM OF UPPER-OUTER QUADRANT OF LEFT BREAST IN FEMALE, ESTROGEN RECEPTOR POSITIVE (HCC): ICD-10-CM

## 2024-10-01 DIAGNOSIS — Z17.0 MALIGNANT NEOPLASM OF UPPER-OUTER QUADRANT OF LEFT BREAST IN FEMALE, ESTROGEN RECEPTOR POSITIVE (HCC): ICD-10-CM

## 2024-10-01 LAB
BASOPHILS # BLD AUTO: 0.04 THOUSANDS/ÂΜL (ref 0–0.1)
BASOPHILS NFR BLD AUTO: 1 % (ref 0–1)
EOSINOPHIL # BLD AUTO: 0.25 THOUSAND/ÂΜL (ref 0–0.61)
EOSINOPHIL NFR BLD AUTO: 4 % (ref 0–6)
ERYTHROCYTE [DISTWIDTH] IN BLOOD BY AUTOMATED COUNT: 12.2 % (ref 11.6–15.1)
HCT VFR BLD AUTO: 44.3 % (ref 34.8–46.1)
HGB BLD-MCNC: 14.3 G/DL (ref 11.5–15.4)
IMM GRANULOCYTES # BLD AUTO: 0.01 THOUSAND/UL (ref 0–0.2)
IMM GRANULOCYTES NFR BLD AUTO: 0 % (ref 0–2)
LYMPHOCYTES # BLD AUTO: 2.17 THOUSANDS/ÂΜL (ref 0.6–4.47)
LYMPHOCYTES NFR BLD AUTO: 38 % (ref 14–44)
MCH RBC QN AUTO: 30.3 PG (ref 26.8–34.3)
MCHC RBC AUTO-ENTMCNC: 32.3 G/DL (ref 31.4–37.4)
MCV RBC AUTO: 94 FL (ref 82–98)
MONOCYTES # BLD AUTO: 0.63 THOUSAND/ÂΜL (ref 0.17–1.22)
MONOCYTES NFR BLD AUTO: 11 % (ref 4–12)
NEUTROPHILS # BLD AUTO: 2.66 THOUSANDS/ÂΜL (ref 1.85–7.62)
NEUTS SEG NFR BLD AUTO: 46 % (ref 43–75)
NRBC BLD AUTO-RTO: 0 /100 WBCS
PLATELET # BLD AUTO: 348 THOUSANDS/UL (ref 149–390)
PMV BLD AUTO: 9.5 FL (ref 8.9–12.7)
RBC # BLD AUTO: 4.72 MILLION/UL (ref 3.81–5.12)
WBC # BLD AUTO: 5.76 THOUSAND/UL (ref 4.31–10.16)

## 2024-10-01 PROCEDURE — 85025 COMPLETE CBC W/AUTO DIFF WBC: CPT

## 2024-10-01 PROCEDURE — 36415 COLL VENOUS BLD VENIPUNCTURE: CPT

## 2024-10-01 PROCEDURE — 80053 COMPREHEN METABOLIC PANEL: CPT

## 2024-10-02 LAB
ALBUMIN SERPL BCG-MCNC: 4 G/DL (ref 3.5–5)
ALP SERPL-CCNC: 80 U/L (ref 34–104)
ALT SERPL W P-5'-P-CCNC: 14 U/L (ref 7–52)
ANION GAP SERPL CALCULATED.3IONS-SCNC: 7 MMOL/L (ref 4–13)
AST SERPL W P-5'-P-CCNC: 17 U/L (ref 13–39)
BILIRUB SERPL-MCNC: 0.68 MG/DL (ref 0.2–1)
BUN SERPL-MCNC: 12 MG/DL (ref 5–25)
CALCIUM SERPL-MCNC: 8.8 MG/DL (ref 8.4–10.2)
CHLORIDE SERPL-SCNC: 101 MMOL/L (ref 96–108)
CO2 SERPL-SCNC: 29 MMOL/L (ref 21–32)
CREAT SERPL-MCNC: 0.65 MG/DL (ref 0.6–1.3)
GFR SERPL CREATININE-BSD FRML MDRD: 94 ML/MIN/1.73SQ M
GLUCOSE P FAST SERPL-MCNC: 81 MG/DL (ref 65–99)
POTASSIUM SERPL-SCNC: 4.4 MMOL/L (ref 3.5–5.3)
PROT SERPL-MCNC: 6.8 G/DL (ref 6.4–8.4)
SODIUM SERPL-SCNC: 137 MMOL/L (ref 135–147)

## 2024-10-03 ENCOUNTER — HOSPITAL ENCOUNTER (OUTPATIENT)
Dept: RADIOLOGY | Age: 64
Discharge: HOME | End: 2024-10-03
Payer: COMMERCIAL

## 2024-10-03 DIAGNOSIS — M81.8 DRUG-INDUCED OSTEOPOROSIS: ICD-10-CM

## 2024-10-03 DIAGNOSIS — Z17.0 MALIGNANT NEOPLASM OF UPPER-OUTER QUADRANT OF LEFT BREAST IN FEMALE, ESTROGEN RECEPTOR POSITIVE (HCC): ICD-10-CM

## 2024-10-03 DIAGNOSIS — T50.905A DRUG-INDUCED OSTEOPOROSIS: ICD-10-CM

## 2024-10-03 DIAGNOSIS — C50.412 MALIGNANT NEOPLASM OF UPPER-OUTER QUADRANT OF LEFT BREAST IN FEMALE, ESTROGEN RECEPTOR POSITIVE (HCC): ICD-10-CM

## 2024-10-03 PROCEDURE — 77080 DXA BONE DENSITY AXIAL: CPT

## 2024-10-09 ENCOUNTER — OFFICE VISIT (OUTPATIENT)
Dept: HEMATOLOGY ONCOLOGY | Facility: CLINIC | Age: 64
End: 2024-10-09
Payer: COMMERCIAL

## 2024-10-09 VITALS
TEMPERATURE: 97.8 F | DIASTOLIC BLOOD PRESSURE: 82 MMHG | RESPIRATION RATE: 18 BRPM | SYSTOLIC BLOOD PRESSURE: 136 MMHG | BODY MASS INDEX: 23.82 KG/M2 | HEIGHT: 65 IN | OXYGEN SATURATION: 97 % | HEART RATE: 76 BPM | WEIGHT: 143 LBS

## 2024-10-09 DIAGNOSIS — E55.9 VITAMIN D DEFICIENCY, UNSPECIFIED: ICD-10-CM

## 2024-10-09 DIAGNOSIS — E78.9 BORDERLINE HIGH CHOLESTEROL: ICD-10-CM

## 2024-10-09 DIAGNOSIS — C50.412 MALIGNANT NEOPLASM OF UPPER-OUTER QUADRANT OF LEFT BREAST IN FEMALE, ESTROGEN RECEPTOR POSITIVE (HCC): Primary | ICD-10-CM

## 2024-10-09 DIAGNOSIS — Z17.0 MALIGNANT NEOPLASM OF UPPER-OUTER QUADRANT OF LEFT BREAST IN FEMALE, ESTROGEN RECEPTOR POSITIVE (HCC): Primary | ICD-10-CM

## 2024-10-09 DIAGNOSIS — M85.80 OSTEOPENIA, UNSPECIFIED LOCATION: ICD-10-CM

## 2024-10-09 PROCEDURE — 99214 OFFICE O/P EST MOD 30 MIN: CPT | Performed by: INTERNAL MEDICINE

## 2024-10-09 RX ORDER — LETROZOLE 2.5 MG/1
2.5 TABLET, FILM COATED ORAL DAILY
Qty: 90 TABLET | Refills: 1 | Status: CANCELLED | OUTPATIENT
Start: 2024-10-09

## 2024-10-09 NOTE — PATIENT INSTRUCTIONS
Please continue taking daily letrozole. Follow-up with Dr. Gilliam in January 2025 with bloodwork 1 week prior to visit (CBC, CMP, and vitamin D levels). Lipid panel to be checked around May 2025.

## 2024-10-09 NOTE — PROGRESS NOTES
Hematology Outpatient Follow - Up Note  Ines Aviles 64 y.o. female MRN: @ Encounter: [id number]    Date:  10/9/2024    Assessment/ Plan:      1. Malignant neoplasm of upper-outer quadrant of left breast in female, estrogen receptor positive (HCC)  CBC and differential    Comprehensive metabolic panel      2. Osteopenia, unspecified location  Vitamin D 25 hydroxy      3. Vitamin D deficiency, unspecified  Vitamin D 25 hydroxy      4. Borderline high cholesterol  Lipid panel        Ms. Aviles is a 64YF with Stage IA (pT1bN0) invasive ductal carcinoma of the left breast, dx in May 2024, s/p left breast lumpectomy along with sentinel lymph node biopsy on 6/7/24. Pathology demonstrated IDC, 8 mm in greatest dimension, overall grade 1, negative margins, % +/MI 95% +/Her2 1+ disease, Ki67 3%.     Oncotype DX RS was 12, suggesting <1% benefit from adjuvant chemo and 3% distant recurrence risk at 9 years with adjuvant hormonal therapy alone.     She enrolled in [facility]  and was randomized to adjuvant RT. On 8/23/24 she completed a course of adjuvant partial breast RT to a dose of 3000cGy in 5 fractions.     Patient started taking letrozole on 9/15/24, tolerating it without any major issues. Patient's baseline DXA scan did show mild osteopenia (T score -1.1 at the left femoral neck). Patient is taking vitamin D and calcium supplements.     Patient knows to continue surveillance with annual mammograms and follow-up with surgical oncology team. Patient is scheduled to see surgical oncology team on 4/14/25. Patient was asked to follow-up in office in about 3 months with repeat CBC, CMP, and vitamin D levels. She knows to call the office with any questions/concerns in the interim.     Summary of recommendations:     Continue letrozole to complete at least 5 years of endocrine therapy  Continue vitamin D and calcium supplements for osteopenia.   Continue surveillance with annual breast mammograms and surgical  oncology follow-up.   Follow-up in office in 3 months with repeat CBC, CMP, and vitamin D levels.     Labs and imaging studies are reviewed by ordering provider once results are available. If there are findings that need immediate attention, you will be contacted when results available. Discussing results and the implication on your healthcare is best discussed in person at your follow-up visit.         Interval History:      Ms. Aviles is a 64YF with Stage IA (pT1bN0) invasive ductal carcinoma of the left breast, dx in May 2024, s/p left breast lumpectomy along with sentinel lymph node biopsy on 6/7/24. Pathology demonstrated IDC, 8 mm in greatest dimension, overall grade 1, negative margins, % +/CO 95% +/Her2 1+ disease, Ki67 3%.     Oncotype DX RS was 12, suggesting <1% benefit from adjuvant chemo and 3% distant recurrence risk at 9 years with adjuvant hormonal therapy alone.     She enrolled in [facility]  and was randomized to adjuvant RT. On 8/23/24 she completed a course of adjuvant partial breast RT to a dose of 3000cGy in 5 fractions. She started taking letrozole on 9/15/24, which she has been tolerating without any hot flashes or arthralgias.     Baseline DXA scan did show osteopenia, for which pt has been taking daily vit D and calcium supplements.     Previous Treatment:      Left breast lumpectomy on 6/7/24   Adjuvant radiation - August 2024   Adjuvant endocrine therapy with letrozole started on 9/15/24.      Test Results:    Imaging: DXA bone density spine hip and pelvis    Result Date: 10/4/2024  Narrative: DXA SCAN CLINICAL HISTORY: 64 years postmenopausal female. OTHER RISK FACTORS: Letrozole therapy. PHARMACOLOGIC THERAPY FOR OSTEOPOROSIS:  None. TECHNIQUE: Bone densitometry was performed using a   [employer] Horizon A bone densitometer.  Regions of interest appear properly placed. COMPARISON: There are no prior DXA studies performed on this unit for comparison. RESULTS: LUMBAR SPINE Level:  L1-L4 : BMD: 0.940 gm/cm2 T-score: -1.0 LEFT  TOTAL HIP: BMD: 0.862 gm/cm2 T-score: -0.7 LEFT  FEMORAL NECK: BMD: 0.722 gm/cm2 T score: -1.1     Impression: 1. Low bone mass (osteopenia). Based on the left femoral neck 2.  The 10 year risk of hip fracture is 0.6% with the 10 year risk of major osteoporotic fracture being 7.5% as calculated by the [employer] fracture risk assessment tool (FRAX, which is based on data generated by the WHO Collaborating [employer] for Metabolic Bone Diseases). 3.  The current NOF guidelines recommend treating patients with a T-score of -2.5 or less in the lumbar spine or hips, or in post-menopausal women and men over the age of 50 with low bone mass (osteopenia) and a FRAX 10 year risk score of >3% for hip fracture and/or >20% for major osteoporotic fracture. 4.  The NOF recommends follow-up DXA in 1-2 years after initiating therapy for osteoporosis and every 2 years thereafter. More frequent evaluation is appropriate for patients with conditions associated with rapid bone loss, such as glucocorticoid therapy. The interval between DXA screenings may be longer for individuals without major risk factors and initial T-score in the normal or upper low bone mass range. The FRAX algorithm has certain limitations: -FRAX has not been validated in patients currently or previously treated with pharmacotherapy for osteoporosis.  In such patients, clinical judgment must be exercised in interpreting FRAX scores. -Prior hip, vertebral and humeral fragility fractures appear to confer greater risk of subsequent fracture than fractures at other sites (this is especially true for individuals with severe vertebral fractures), but quantification of this incremental risk is not possible with FRAX. -FRAX underestimates fracture risk in patients with history of multiple fragility fractures. -FRAX may underestimate fracture risk in patients with history of frequent falls. -It is not appropriate to  "use FRAX to monitor treatment response. WHO CLASSIFICATION: Normal (a T-score of -1.0 or higher) Low bone mineral density (a T-score of less than -1.0 but higher than -2.5) Osteoporosis (a T-score of -2.5 or less) Severe osteoporosis (a T-score of -2.5 or less with a fragility fracture) Workstation performed: [id number]       Labs:   Lab Results   Component Value Date    WBC 5.76 10/01/2024    HGB 14.3 10/01/2024    HCT 44.3 10/01/2024    MCV 94 10/01/2024     10/01/2024     Lab Results   Component Value Date    K 4.4 10/01/2024     10/01/2024    CO2 29 10/01/2024    BUN 12 10/01/2024    CREATININE 0.65 10/01/2024    GLUF 81 10/01/2024    CALCIUM 8.8 10/01/2024    AST 17 10/01/2024    ALT 14 10/01/2024    ALKPHOS 80 10/01/2024    EGFR 94 10/01/2024       No results found for: \"IRON\", \"TIBC\", \"FERRITIN\"    No results found for: \"UHBOOLEC20\"      ROS: Review of Systems   Constitutional:  Negative for appetite change, chills, fatigue and unexpected weight change.   HENT:   Negative for mouth sores, nosebleeds, sore throat, trouble swallowing and voice change.    Eyes:  Negative for eye problems and icterus.   Respiratory:  Negative for cough, shortness of breath and wheezing.    Cardiovascular:  Negative for chest pain, leg swelling and palpitations.   Gastrointestinal:  Negative for abdominal distention, abdominal pain, constipation, diarrhea, nausea and vomiting.   Genitourinary:  Negative for difficulty urinating, frequency and hematuria.    Musculoskeletal:  Negative for arthralgias, back pain, flank pain and gait problem.   Skin:  Negative for itching, rash and wound.   Neurological:  Negative for dizziness, extremity weakness, gait problem, headaches, light-headedness, numbness and speech difficulty.   Hematological:  Negative for adenopathy. Does not bruise/bleed easily.   Psychiatric/Behavioral:  Negative for confusion and decreased concentration. The patient is not nervous/anxious.       Current " Medications: Reviewed  Allergies: Reviewed  PMH/FH/SH:  Reviewed    Physical Exam:    Body surface area is 1.72 meters squared.    Wt Readings from Last 3 Encounters:   10/09/24 64.9 kg (143 lb)   09/20/24 64.9 kg (143 lb)   07/09/24 65.8 kg (145 lb)        Temp Readings from Last 3 Encounters:   10/09/24 97.8 °F (36.6 °C) (Temporal)   09/20/24 98.9 °F (37.2 °C) (Temporal)   07/11/24 (!) 96.8 °F (36 °C) (Temporal)        BP Readings from Last 3 Encounters:   10/09/24 136/82   09/20/24 105/74   07/11/24 119/57         Pulse Readings from Last 3 Encounters:   10/09/24 76   07/11/24 82   07/09/24 75        Physical Exam  Vitals reviewed.   Constitutional:       General: She is not in acute distress.     Appearance: She is not ill-appearing, toxic-appearing or diaphoretic.   HENT:      Mouth/Throat:      Mouth: Mucous membranes are moist.      Pharynx: No oropharyngeal exudate or posterior oropharyngeal erythema.   Eyes:      General: No scleral icterus.     Extraocular Movements: Extraocular movements intact.      Conjunctiva/sclera: Conjunctivae normal.   Cardiovascular:      Rate and Rhythm: Normal rate and regular rhythm.      Pulses: Normal pulses.      Heart sounds: Normal heart sounds. No murmur heard.     No gallop.   Pulmonary:      Effort: Pulmonary effort is normal.      Breath sounds: Normal breath sounds. No stridor. No wheezing, rhonchi or rales.   Abdominal:      General: Bowel sounds are normal. There is no distension.      Palpations: Abdomen is soft. There is no mass.      Tenderness: There is no abdominal tenderness.   Musculoskeletal:         General: No swelling or tenderness.      Cervical back: Normal range of motion. No rigidity.      Right lower leg: No edema.      Left lower leg: No edema.   Lymphadenopathy:      Cervical: No cervical adenopathy.   Skin:     Capillary Refill: Capillary refill takes less than 2 seconds.      Coloration: Skin is not jaundiced or pale.      Findings: No bruising  or rash.   Neurological:      General: No focal deficit present.      Mental Status: She is alert and oriented to person, place, and time.      Motor: No weakness.       ECOG PS: 0

## 2025-01-04 DIAGNOSIS — Z17.0 MALIGNANT NEOPLASM OF UPPER-OUTER QUADRANT OF LEFT BREAST IN FEMALE, ESTROGEN RECEPTOR POSITIVE (HCC): ICD-10-CM

## 2025-01-04 DIAGNOSIS — C50.412 MALIGNANT NEOPLASM OF UPPER-OUTER QUADRANT OF LEFT BREAST IN FEMALE, ESTROGEN RECEPTOR POSITIVE (HCC): ICD-10-CM

## 2025-01-06 RX ORDER — LETROZOLE 2.5 MG/1
2.5 TABLET, FILM COATED ORAL DAILY
Qty: 30 TABLET | Refills: 5 | Status: SHIPPED | OUTPATIENT
Start: 2025-01-06 | End: 2025-01-14 | Stop reason: SDUPTHER

## 2025-01-10 ENCOUNTER — APPOINTMENT (OUTPATIENT)
Dept: LAB | Facility: CLINIC | Age: 65
End: 2025-01-10
Payer: COMMERCIAL

## 2025-01-10 DIAGNOSIS — M85.80 OSTEOPENIA, UNSPECIFIED LOCATION: ICD-10-CM

## 2025-01-10 DIAGNOSIS — C50.412 MALIGNANT NEOPLASM OF UPPER-OUTER QUADRANT OF LEFT BREAST IN FEMALE, ESTROGEN RECEPTOR POSITIVE (HCC): ICD-10-CM

## 2025-01-10 DIAGNOSIS — Z17.0 MALIGNANT NEOPLASM OF UPPER-OUTER QUADRANT OF LEFT BREAST IN FEMALE, ESTROGEN RECEPTOR POSITIVE (HCC): ICD-10-CM

## 2025-01-10 DIAGNOSIS — E55.9 VITAMIN D DEFICIENCY, UNSPECIFIED: ICD-10-CM

## 2025-01-10 LAB
25(OH)D3 SERPL-MCNC: 59.3 NG/ML (ref 30–100)
ALBUMIN SERPL BCG-MCNC: 4.2 G/DL (ref 3.5–5)
ALP SERPL-CCNC: 73 U/L (ref 34–104)
ALT SERPL W P-5'-P-CCNC: 11 U/L (ref 7–52)
ANION GAP SERPL CALCULATED.3IONS-SCNC: 9 MMOL/L (ref 4–13)
AST SERPL W P-5'-P-CCNC: 16 U/L (ref 13–39)
BASOPHILS # BLD AUTO: 0.06 THOUSANDS/ΜL (ref 0–0.1)
BASOPHILS NFR BLD AUTO: 1 % (ref 0–1)
BILIRUB SERPL-MCNC: 0.47 MG/DL (ref 0.2–1)
BUN SERPL-MCNC: 17 MG/DL (ref 5–25)
CALCIUM SERPL-MCNC: 9.6 MG/DL (ref 8.4–10.2)
CHLORIDE SERPL-SCNC: 104 MMOL/L (ref 96–108)
CO2 SERPL-SCNC: 26 MMOL/L (ref 21–32)
CREAT SERPL-MCNC: 0.7 MG/DL (ref 0.6–1.3)
EOSINOPHIL # BLD AUTO: 0.11 THOUSAND/ΜL (ref 0–0.61)
EOSINOPHIL NFR BLD AUTO: 2 % (ref 0–6)
ERYTHROCYTE [DISTWIDTH] IN BLOOD BY AUTOMATED COUNT: 12.7 % (ref 11.6–15.1)
GFR SERPL CREATININE-BSD FRML MDRD: 92 ML/MIN/1.73SQ M
GLUCOSE SERPL-MCNC: 88 MG/DL (ref 65–140)
HCT VFR BLD AUTO: 46.8 % (ref 34.8–46.1)
HGB BLD-MCNC: 14.4 G/DL (ref 11.5–15.4)
IMM GRANULOCYTES # BLD AUTO: 0.01 THOUSAND/UL (ref 0–0.2)
IMM GRANULOCYTES NFR BLD AUTO: 0 % (ref 0–2)
LYMPHOCYTES # BLD AUTO: 2.21 THOUSANDS/ΜL (ref 0.6–4.47)
LYMPHOCYTES NFR BLD AUTO: 36 % (ref 14–44)
MCH RBC QN AUTO: 30.4 PG (ref 26.8–34.3)
MCHC RBC AUTO-ENTMCNC: 30.8 G/DL (ref 31.4–37.4)
MCV RBC AUTO: 99 FL (ref 82–98)
MONOCYTES # BLD AUTO: 0.51 THOUSAND/ΜL (ref 0.17–1.22)
MONOCYTES NFR BLD AUTO: 8 % (ref 4–12)
NEUTROPHILS # BLD AUTO: 3.31 THOUSANDS/ΜL (ref 1.85–7.62)
NEUTS SEG NFR BLD AUTO: 53 % (ref 43–75)
NRBC BLD AUTO-RTO: 0 /100 WBCS
PLATELET # BLD AUTO: 313 THOUSANDS/UL (ref 149–390)
PMV BLD AUTO: 9.6 FL (ref 8.9–12.7)
POTASSIUM SERPL-SCNC: 4.2 MMOL/L (ref 3.5–5.3)
PROT SERPL-MCNC: 6.7 G/DL (ref 6.4–8.4)
RBC # BLD AUTO: 4.74 MILLION/UL (ref 3.81–5.12)
SODIUM SERPL-SCNC: 139 MMOL/L (ref 135–147)
WBC # BLD AUTO: 6.21 THOUSAND/UL (ref 4.31–10.16)

## 2025-01-10 PROCEDURE — 80053 COMPREHEN METABOLIC PANEL: CPT

## 2025-01-10 PROCEDURE — 36415 COLL VENOUS BLD VENIPUNCTURE: CPT

## 2025-01-10 PROCEDURE — 85025 COMPLETE CBC W/AUTO DIFF WBC: CPT

## 2025-01-10 PROCEDURE — 82306 VITAMIN D 25 HYDROXY: CPT

## 2025-01-14 ENCOUNTER — OFFICE VISIT (OUTPATIENT)
Dept: HEMATOLOGY ONCOLOGY | Facility: CLINIC | Age: 65
End: 2025-01-14
Payer: COMMERCIAL

## 2025-01-14 VITALS
RESPIRATION RATE: 18 BRPM | HEART RATE: 99 BPM | BODY MASS INDEX: 23.82 KG/M2 | DIASTOLIC BLOOD PRESSURE: 76 MMHG | SYSTOLIC BLOOD PRESSURE: 118 MMHG | WEIGHT: 143 LBS | TEMPERATURE: 97.2 F | HEIGHT: 65 IN | OXYGEN SATURATION: 97 %

## 2025-01-14 DIAGNOSIS — C50.412 MALIGNANT NEOPLASM OF UPPER-OUTER QUADRANT OF LEFT BREAST IN FEMALE, ESTROGEN RECEPTOR POSITIVE (HCC): Primary | ICD-10-CM

## 2025-01-14 DIAGNOSIS — Z13.71 BRCA GENE MUTATION NEGATIVE: ICD-10-CM

## 2025-01-14 DIAGNOSIS — T50.905A DRUG-INDUCED OSTEOPENIA: ICD-10-CM

## 2025-01-14 DIAGNOSIS — Z17.0 MALIGNANT NEOPLASM OF UPPER-OUTER QUADRANT OF LEFT BREAST IN FEMALE, ESTROGEN RECEPTOR POSITIVE (HCC): Primary | ICD-10-CM

## 2025-01-14 DIAGNOSIS — M85.80 DRUG-INDUCED OSTEOPENIA: ICD-10-CM

## 2025-01-14 PROCEDURE — 99214 OFFICE O/P EST MOD 30 MIN: CPT | Performed by: INTERNAL MEDICINE

## 2025-01-14 RX ORDER — LETROZOLE 2.5 MG/1
2.5 TABLET, FILM COATED ORAL DAILY
Qty: 90 TABLET | Refills: 3 | Status: SHIPPED | OUTPATIENT
Start: 2025-01-14

## 2025-01-14 NOTE — PROGRESS NOTES
Name: Ines Aviles      : 1960      MRN: [id number]  Encounter Provider: Jose Gilliam MD  Encounter Date: 2025   Encounter department: [facility]  :  Assessment & Plan  Infiltrating ductal carcinoma of left breast (HCC)    Stage Ia IDC of left breast HR positive, HER2 1+ on 2024 status post lumpectomy/radiation, continuing AI with letrozole started 2024 for total 5 years.    Goal for total of 5 years on AI  Repeat CBC/CMP  Follow-up in 6 months    Drug-induced osteoporosis    Encouraged weightbearing exercises as tolerated  Continue daily vitamin D and calcium  Repeat DEXA 10/26  BRCA gene mutation negative      Malignant neoplasm of upper-outer quadrant of left breast in female, estrogen receptor positive (HCC)    Orders:    letrozole (FEMARA) 2.5 mg tablet; Take 1 tablet (2.5 mg total) by mouth in the morning        History of Present Illness   Chief Complaint   Patient presents with    Follow-up     Stage IA (pT1bN0) invasive ductal carcinoma of the left breast, dx in May 2024, s/p left breast lumpectomy along with sentinel lymph node biopsy on 24. Pathology demonstrated IDC, 8 mm in greatest dimension, overall grade 1, negative margins, % +/TX 95% +/Her2 1+ disease, Ki67 3%.     Oncotype DX RS was 12, suggesting <1% benefit from adjuvant chemo and 3% distant recurrence risk at 9 years with adjuvant hormonal therapy alone.     10/24: Baseline DXA scan: osteopenia, for which pt has been taking daily vit D and calcium supplements.      Previous Treatment:    Left breast lumpectomy on 24   Adjuvant radiation - 2024   Adjuvant endocrine therapy with letrozole started on 9/15/24.     Interval history:    Overall patient is doing well  Taking vitamin D and calcium every day  Denies any significant arthralgias or hot flashes with letrozole tolerating medication with no missed doses    Denies any new lumps or bumps on her  bursa    Inquires about mildly elevated hematocrit and MCV being 99 on labs recommended just continue to monitor no indication for further testing if hemoglobin continues to rise to 15.5-16 will consider further testing which patient understands    Oncology History   Cancer Staging   Infiltrating ductal carcinoma of left breast (HCC)  Staging form: Breast, AJCC 8th Edition  - Clinical stage from 5/1/2024: Stage IA (cT1, cN0, cM0, G1, ER+, WV+, HER2-) - Signed by Svetlana Anguiaon MD on 5/20/2024  Stage prefix: Initial diagnosis  Method of lymph node assessment: Clinical  Histologic grading system: 3 grade system  - Pathologic stage from 6/7/2024: pT1b, pN0(sn), cM0, G2, ER+, WV+ - Signed by Svetlana Anguiano MD on 6/20/2024  Stage prefix: Initial diagnosis  Method of lymph node assessment: [place] lymph node biopsy  Histologic grading system: 3 grade system  Oncology History Overview Note   with newly diagnosed carcinoma of the left breast.  She was asymptomatic referable to the breast.  She had an outside abnormal mammogram done in [place].  She had a biopsy with them as well, revealing invasive ductal carcinoma, grade 1, , WV 95, HER 2 1+, Ki-67 3. Genetic testing was negative. On 6/7/23 she underwent left breast joseph  localized lumpectomy woth SLN biopsy, with Dr. Anguiano. Pathology: Invasive ductal carcinoma of no special type (ductal), grade 1, 8 mm, margins negative, 0/4 lymph nodes.  On 8/23/24 she completed a course of adjuvant partial breast RT to a dose of 3000cGy in 5 fractions delivered EOD.  The pt was last seen in radiation on 09/20/24. She returns today for her follow up.    10/03/24 - DXA bone density spine hip and pelvis  IMPRESSION:  1. Low bone mass (osteopenia). Based on the left femoral neck  2.  The 10 year risk of hip fracture is 0.6% with the 10 year risk of major osteoporotic fracture being 7.5% as calculated by the [employer] fracture risk assessment tool (FRAX, which is  based on data generated by the WHO Collaborating   [place] for Metabolic Bone Diseases).  3.  The current NOF guidelines recommend treating patients with a T-score of -2.5 or less in the lumbar spine or hips, or in post-menopausal women and men over the age of 50 with low bone mass (osteopenia) and a FRAX 10 year risk score of >3% for hip   fracture and/or >20% for major osteoporotic fracture.  4.  The NOF recommends follow-up DXA in 1-2 years after initiating therapy for osteoporosis and every 2 years thereafter. More frequent evaluation is appropriate for patients with conditions associated with rapid bone loss, such as glucocorticoid   therapy. The interval between DXA screenings may be longer for individuals without major risk factors and initial T-score in the normal or upper low bone mass range.    10/09/24 - Hem Sivlano Stewart  Enrolled in [employer]  - randomized to adjuvant RT  Pt taking Letrozole - tolerating without major issues     25 - Hem Andrae Stewart   Goal to remain on AI for 5 years       Upcomin25 - Surg OncChris       Infiltrating ductal carcinoma of left breast (HCC)   2024 Biopsy    Left breast ultrasound-guided biopsy ([place], [place])  1 o'clock, 4-5 cm from nipple (ribbon)  Invasive ductal carcinoma  Grade 1  , WI 95, HER2 1+, Ki-67 3  One focus suspicious for lymphovascular invasion    Concordant. Malignancy appears unifocal; measures up to 6 mm. No left axillary adenopathy. Right breast clear.     2024 -  Cancer Staged    Staging form: Breast, AJCC 8th Edition  - Clinical stage from 2024: Stage IA (cT1, cN0, cM0, G1, ER+, WI+, HER2-) - Signed by Svetlana Anguiano MD on 2024  Stage prefix: Initial diagnosis  Method of lymph node assessment: Clinical  Histologic grading system: 3 grade system       2024 Genetic Testing    NEGATIVE: No Clinically Significant Variants Detected  A total of 59 genes were evaluated with RNA insight: APC, FREDA, BAP1, BARD1,  "BMPR1A, BRCA1, BRCA2, BRIP1, CDH1, CDK4, CDKN1B, CDKN2A, CHEK2, DICER1, FH, FLCN, KIF1B, MAX, MEN1, MET, MLH1, MSH2, MSH6, MUTYH, NF1, NTHL1, PALB2, PMS2, POT1, PTEN, RAD51C, RAD51D, RB1, RET, SDHA, SDHAF2, SDHB, SDHC, SDHD, SMAD4, SMARCA4, STK11, CNEC417, TP53, TSC1, TSC2 and VHL (sequencing and deletion/duplication); AXIN2, CTNNA1, EGLN1, HOXB13, KIT, MITF, MSH3, PDGFRA, POLD1 and POLE (sequencing only); EPCAM and GREM1 (deletion/duplication only).  [place]     6/7/2024 Surgery    Left breast DARCIE  directed lumpectomy with SLN biopsy  Invasive carcinoma of no special type (ductal)  Grade 1  8 mm  Margins negative  0/4 lymph nodes     6/7/2024 -  Cancer Staged    Staging form: Breast, AJCC 8th Edition  - Pathologic stage from 6/7/2024: pT1b, pN0(sn), cM0, G2, ER+, NM+ - Signed by Svetlana Anguiano MD on 6/20/2024  Stage prefix: Initial diagnosis  Method of lymph node assessment: [place] lymph node biopsy  Histologic grading system: 3 grade system       8/14/2024 - 8/23/2024 Radiation    Treatments:  Course: C1    Plan ID Energy Fractions Dose per Fraction (cGy) Dose Correction (cGy) Total Dose Delivered (cGy) Elapsed Days   BH L APBI 6X 5 / 5 600 0 3,000 9      Treatment dates:  8/14/2024 - 8/23/2024         Pertinent Medical History   01/14/25:   Osteopenia    Review of Systems   All other systems reviewed and are negative.      Objective   /76 (BP Location: Left arm, Patient Position: Sitting, Cuff Size: Adult)   Pulse 99   Temp (!) 97.2 °F (36.2 °C) (Temporal)   Resp 18   Ht 5' 5\" (1.651 m)   Wt 64.9 kg (143 lb)   SpO2 97%   BMI 23.80 kg/m²     Pain Screening:  Pain Score: 0-No pain  ECOG   0  Physical Exam  Constitutional:       General: She is not in acute distress.     Appearance: Normal appearance.   HENT:      Head: Normocephalic and atraumatic.      Nose: Nose normal.   Eyes:      Extraocular Movements: Extraocular movements intact.      Pupils: Pupils are equal, round, and reactive to light. "   Cardiovascular:      Rate and Rhythm: Normal rate and regular rhythm.   Pulmonary:      Effort: Pulmonary effort is normal.   Abdominal:      General: Bowel sounds are normal.      Palpations: Abdomen is soft.   Musculoskeletal:         General: Normal range of motion.      Cervical back: Normal range of motion.      Right lower leg: No edema.      Left lower leg: No edema.   Lymphadenopathy:      Cervical: No cervical adenopathy.   Skin:     General: Skin is warm.   Neurological:      General: No focal deficit present.   Psychiatric:         Mood and Affect: Mood normal.       Labs: I have reviewed the following labs:  Lab Results   Component Value Date/Time    WBC 6.21 01/10/2025 10:26 AM    RBC 4.74 01/10/2025 10:26 AM    Hemoglobin 14.4 01/10/2025 10:26 AM    Hematocrit 46.8 (H) 01/10/2025 10:26 AM    MCV 99 (H) 01/10/2025 10:26 AM    MCH 30.4 01/10/2025 10:26 AM    RDW 12.7 01/10/2025 10:26 AM    Platelets 313 01/10/2025 10:26 AM    Segmented % 53 01/10/2025 10:26 AM    Lymphocytes % 36 01/10/2025 10:26 AM    Monocytes % 8 01/10/2025 10:26 AM    Eosinophils Relative 2 01/10/2025 10:26 AM    Basophils Relative 1 01/10/2025 10:26 AM    Immature Grans % 0 01/10/2025 10:26 AM    Absolute Neutrophils 3.31 01/10/2025 10:26 AM     Lab Results   Component Value Date/Time    Potassium 4.2 01/10/2025 10:26 AM    Chloride 104 01/10/2025 10:26 AM    CO2 26 01/10/2025 10:26 AM    BUN 17 01/10/2025 10:26 AM    Creatinine 0.70 01/10/2025 10:26 AM    Glucose, Fasting 81 10/01/2024 08:30 AM    Calcium 9.6 01/10/2025 10:26 AM    AST 16 01/10/2025 10:26 AM    ALT 11 01/10/2025 10:26 AM    Alkaline Phosphatase 73 01/10/2025 10:26 AM    Total Protein 6.7 01/10/2025 10:26 AM    Albumin 4.2 01/10/2025 10:26 AM    Total Bilirubin 0.47 01/10/2025 10:26 AM    eGFR 92 01/10/2025 10:26 AM     Desean Abebe DO  PGY4 Hematology/Oncology Fellow

## 2025-01-14 NOTE — ASSESSMENT & PLAN NOTE
Orders:    letrozole (FEMARA) 2.5 mg tablet; Take 1 tablet (2.5 mg total) by mouth in the morning

## 2025-01-14 NOTE — ASSESSMENT & PLAN NOTE
Stage Ia IDC of left breast HR positive, HER2 1+ on 5/2024 status post lumpectomy/radiation, continuing AI with letrozole started 9/2024 for total 5 years.    Goal for total of 5 years on AI  Repeat CBC/CMP  Follow-up in 6 months

## 2025-01-14 NOTE — ASSESSMENT & PLAN NOTE
Encouraged weightbearing exercises as tolerated  Continue daily vitamin D and calcium  Repeat DEXA 10/26

## 2025-01-15 PROBLEM — T50.905A DRUG-INDUCED OSTEOPENIA: Status: ACTIVE | Noted: 2025-01-15

## 2025-01-15 PROBLEM — M85.80 DRUG-INDUCED OSTEOPENIA: Status: ACTIVE | Noted: 2025-01-15

## 2025-01-27 ENCOUNTER — DOCUMENTATION (OUTPATIENT)
Dept: OTHER | Facility: HOSPITAL | Age: 65
End: 2025-01-27

## 2025-01-27 ENCOUNTER — OFFICE VISIT (OUTPATIENT)
Dept: RADIATION ONCOLOGY | Facility: HOSPITAL | Age: 65
End: 2025-01-27
Attending: STUDENT IN AN ORGANIZED HEALTH CARE EDUCATION/TRAINING PROGRAM
Payer: COMMERCIAL

## 2025-01-27 VITALS
HEART RATE: 77 BPM | TEMPERATURE: 98.4 F | SYSTOLIC BLOOD PRESSURE: 126 MMHG | OXYGEN SATURATION: 95 % | RESPIRATION RATE: 18 BRPM | DIASTOLIC BLOOD PRESSURE: 82 MMHG

## 2025-01-27 DIAGNOSIS — C50.912 INFILTRATING DUCTAL CARCINOMA OF LEFT BREAST (HCC): Primary | ICD-10-CM

## 2025-01-27 PROCEDURE — 99212 OFFICE O/P EST SF 10 MIN: CPT | Performed by: STUDENT IN AN ORGANIZED HEALTH CARE EDUCATION/TRAINING PROGRAM

## 2025-01-27 PROCEDURE — 99211 OFF/OP EST MAY X REQ PHY/QHP: CPT | Performed by: STUDENT IN AN ORGANIZED HEALTH CARE EDUCATION/TRAINING PROGRAM

## 2025-01-27 NOTE — PROGRESS NOTES
<Nayeli Kaplan - Last Filed: 01/26/19 13:06>





Subjective





- Date & Time of Evaluation


Date of Evaluation: 01/26/19


Time of Evaluation: 07:45





- Subjective


Subjective: 





[facility] progress note for Dr. Rivero





Patient seen and examined at bedside. Overnight patient spiked temp 101.1F as 

per nursing. Patient stated he was unable to sleep well. He has good UO from 

tolentino and abdominal pain has resolved. Urine is clear and draining well. Denied 

acute complaints fever, chills, headache, dizziness, chest pain, palpitations, 

SOB, cough, abd pain, nausea, vomiting, bowel complaints, pain/swelling legs 

bilaterally. 





Objective





- Vital Signs/Intake and Output


Vital Signs (last 24 hours): 


                                        











Temp Pulse Resp BP Pulse Ox


 


 101.1 F H  108 H  18   130/48 L  95 


 


 01/25/19 22:00  01/25/19 22:00  01/25/19 22:00  01/25/19 22:00  01/25/19 22:00








Intake and Output: 


                                        











 01/26/19 01/26/19





 06:59 18:59


 


Intake Total 300 


 


Output Total 1500 


 


Balance -1200 














- Medications


Medications: 


                               Current Medications





Carbamazepine (Tegretol)  200 mg PO TID RICHAR; Protocol


   Last Admin: 01/25/19 18:02 Dose:  200 mg


Fentanyl (Duragesic)  1 patch TD Q72 RICHAR


   Last Admin: 01/25/19 15:34 Dose:  1 patch


Meropenem (Merrem Iv 1 Gm Premix)  1 gm in 50 mls @ 100 mls/hr IVPB Q8 RICHAR; 

Protocol


   Last Admin: 01/26/19 05:38 Dose:  100 mls/hr


Vancomycin HCl (Vancomycin 1gm)  1 gm in 250 mls @ 167 mls/hr IVPB Q12H RICHAR; 

Protocol


   Last Admin: 01/26/19 02:31 Dose:  167 mls/hr


Lorazepam (Ativan)  1 mg IVP Q6H PRN; Protocol


   PRN Reason: Seizure activity


Pantoprazole Sodium (Protonix Ec Tab)  40 mg PO ACB RICHAR


   Last Admin: 01/25/19 08:10 Dose:  40 mg











- Labs


Labs: 


                                        





                                 01/26/19 06:35 





                                 01/26/19 06:35 











- Constitutional


Appears: Non-toxic, No Acute Distress





- Head Exam


Head Exam: ATRAUMATIC, NORMAL INSPECTION, NORMOCEPHALIC





- Eye Exam


Eye Exam: EOMI, Normal appearance, PERRL.  absent: Conjunctival injection, 

Scleral icterus





- ENT Exam


ENT Exam: Mucous Membranes Moist





- Neck Exam


Neck Exam: absent: Lymphadenopathy





- Respiratory Exam


Respiratory Exam: Decreased Breath Sounds, Clear to Ausculation Bilateral, 

NORMAL BREATHING PATTERN.  absent: Respiratory Distress





- Cardiovascular Exam


Cardiovascular Exam: +S1, +S2.  absent: Bradycardia, Tachycardia





- GI/Abdominal Exam


GI & Abdominal Exam: Soft, Normal Bowel Sounds.  absent: Firm, Guarding, Rigid, 

Tenderness





- Extremities Exam


Extremities Exam: Normal Capillary Refill.  absent: Calf Tenderness, Pedal Edema





- Neurological Exam


Neurological Exam: Alert, Awake, CN II-XII Intact





- Psychiatric Exam


Psychiatric exam: Normal Affect, Normal Mood





- Skin


Skin Exam: Dry, Intact, Normal Color, Warm





Assessment and Plan





- Assessment and Plan (Free Text)


Assessment: 





1. Sepsis likely secondary to complicated UTI with indwelling tolentino catheter- 

improving


2. Acute UTI 2/2 Proteus Mirabilis and E. Faecalis


3. Delirium likely secondary to UTI- resolved


4. Abdominal pain likely secondary to urinary retention- resolved


5. Chronic indwelling tolentino catheter


6. Prostate ca on Lupro monthly


7. Chronic pain


8. Seizure disorder


9. Wheelchair bound


10. Glaucoma


Plan: 





In light of temp overnight repeat blood cultures x 2, urine culture, and CXR 

ordered. Will follow up. Urine culture on admission +Proteus and +E. Faecalis. 

Patient's abx adjusted as per ID. Started on Zosyn Day 1 of 10. Patient received

3 days of Vanc and Merrem. Procalcitonin <0.05. CXR on admission unremarkable. 

Will monitor patient closely for temp. If patient becomes delirious recommend 

redirection. Monitor patient's Is and Os strictly. Continue patient's tegretol 

for seizure disorder. Seizure precautions and prn ativan in place. Urology 

consulted- f/u reccs. Patent pain controlled with home Fentanyl patch. I spoke 

to son Cristino [phone] this morning and updated him on patient course. PT on 

board. HoB above 30 degrees and fall risk protocol in place. HHD diet ordered. 

Will continue to monitor patient closely.





Discussed with Dr. Josefa Kaplan PGY3





<Nathan Rivero S - Last Filed: 01/26/19 19:45>





Objective





- Vital Signs/Intake and Output


Vital Signs (last 24 hours): 


                                        











Temp Pulse Resp BP Pulse Ox


 


 97.6 F   95 H  18   141/70   97 


 


 01/26/19 06:00  01/26/19 06:00  01/26/19 06:00  01/26/19 06:00  01/26/19 06:00











- Medications


Medications: 


                               Current Medications





Carbamazepine (Tegretol)  200 mg PO TID RICHAR; Protocol


   Last Admin: 01/26/19 17:51 Dose:  200 mg


Fentanyl (Duragesic)  1 patch TD Q72 RICHAR


   Last Admin: 01/25/19 15:34 Dose:  1 patch


Piperacillin Sod/Tazobactam Sod (Zosyn 3.375 In Ns 100ml)  100 mls @ 25 mls/hr 

IVPB Q8 RICHAR; Protocol


   Stop: 02/05/19 14:01


   Last Admin: 01/26/19 14:54 Dose:  25 mls/hr


Lorazepam (Ativan)  1 mg IVP Q6H PRN; Protocol


   PRN Reason: Seizure activity


Pantoprazole Sodium (Protonix Ec Tab)  40 mg PO ACB [facility]


   Last Admin: 01/26/19 10:12 Dose:  40 mg











- Labs


Labs: 


                                        





                                 01/26/19 06:35 





                                 01/26/19 06:35 











Assessment and Plan





- Assessment and Plan (Free Text)


Plan: 





Pt seen and examined by me. I have reviewed the note of the medical resident and

I agree with it. I have discussed the assessment and plan with the resident. I 

have reviewed the medications and the last labs. Pt with sepsis due to UTI and 

is on IV Vanco and Merrem. He is feeling better. No fever. Pt's delerium has 

improved. Hyponatremia and Hyperkalemia has improved . He has Prostate cancer 

and no issues at this time. Gross hematuria has improved. Ines Aviles 1960 is a 64 y.o. female with newly diagnosed carcinoma of the left breast.  She was asymptomatic referable to the breast.  She had an outside abnormal mammogram done in [place].  She had a biopsy with them as well, revealing invasive ductal carcinoma, grade 1, , WA 95, HER 2 1+, Ki-67 3. Genetic testing was negative. On 6/7/23 she underwent left breast joseph  localized lumpectomy woth SLN biopsy, with Dr. Anguiano. Pathology: Invasive ductal carcinoma of no special type (ductal), grade 1, 8 mm, margins negative, 0/4 lymph nodes.  On 8/23/24 she completed a course of adjuvant partial breast RT to a dose of 3000cGy in 5 fractions delivered EOD.  The pt was last seen in radiation on 09/20/24. She returns today for her follow up.    10/03/24 - DXA bone density spine hip and pelvis  IMPRESSION:  1. Low bone mass (osteopenia). Based on the left femoral neck  2.  The 10 year risk of hip fracture is 0.6% with the 10 year risk of major osteoporotic fracture being 7.5% as calculated by the [employer] fracture risk assessment tool (FRAX, which is based on data generated by the WHO Collaborating   [place] for Metabolic Bone Diseases).  3.  The current NOF guidelines recommend treating patients with a T-score of -2.5 or less in the lumbar spine or hips, or in post-menopausal women and men over the age of 50 with low bone mass (osteopenia) and a FRAX 10 year risk score of >3% for hip   fracture and/or >20% for major osteoporotic fracture.  4.  The NOF recommends follow-up DXA in 1-2 years after initiating therapy for osteoporosis and every 2 years thereafter. More frequent evaluation is appropriate for patients with conditions associated with rapid bone loss, such as glucocorticoid   therapy. The interval between DXA screenings may be longer for individuals without major risk factors and initial T-score in the normal or upper low bone mass range.    10/09/24 - Hem Onc,  [facility]  Enrolled in [facility]  - randomized to adjuvant RT  Pt taking Letrozole - tolerating without major issues     25 - Hem Onc[facility]   Goal to remain on AI for 5 years       Upcomin25 - Surg Onc, [facility]      Oncology History   Infiltrating ductal carcinoma of left breast (HCC)   2024 Biopsy    Left breast ultrasound-guided biopsy ([place], [place])  1 o'clock, 4-5 cm from nipple (ribbon)  Invasive ductal carcinoma  Grade 1  , IN 95, HER2 1+, Ki-67 3  One focus suspicious for lymphovascular invasion    Concordant. Malignancy appears unifocal; measures up to 6 mm. No left axillary adenopathy. Right breast clear.     2024 -  Cancer Staged    Staging form: Breast, AJCC 8th Edition  - Clinical stage from 2024: Stage IA (cT1, cN0, cM0, G1, ER+, IN+, HER2-) - Signed by Svetlana Anguiano MD on 2024  Stage prefix: Initial diagnosis  Method of lymph node assessment: Clinical  Histologic grading system: 3 grade system       2024 Genetic Testing    NEGATIVE: No Clinically Significant Variants Detected  A total of 59 genes were evaluated with RNA insight: APC, FREDA, BAP1, BARD1, BMPR1A, BRCA1, BRCA2, BRIP1, CDH1, CDK4, CDKN1B, CDKN2A, CHEK2, DICER1, FH, FLCN, KIF1B, MAX, MEN1, MET, MLH1, MSH2, MSH6, MUTYH, NF1, NTHL1, PALB2, PMS2, POT1, PTEN, RAD51C, RAD51D, RB1, RET, SDHA, SDHAF2, SDHB, SDHC, SDHD, SMAD4, SMARCA4, STK11, TLLP100, TP53, TSC1, TSC2 and VHL (sequencing and deletion/duplication); AXIN2, CTNNA1, EGLN1, HOXB13, KIT, MITF, MSH3, PDGFRA, POLD1 and POLE (sequencing only); EPCAM and GREM1 (deletion/duplication only).  [place]     2024 Surgery    Left breast DARCIE  directed lumpectomy with SLN biopsy  Invasive carcinoma of no special type (ductal)  Grade 1  8 mm  Margins negative  0/4 lymph nodes     2024 -  Cancer Staged    Staging form: Breast, AJCC 8th Edition  - Pathologic stage from 2024: pT1b, pN0(sn), cM0, G2, ER+, IN+ - Signed by Svetlana Anguiano MD on  6/20/2024  Stage prefix: Initial diagnosis  Method of lymph node assessment: [place] lymph node biopsy  Histologic grading system: 3 grade system       8/14/2024 - 8/23/2024 Radiation    Treatments:  Course: C1    Plan ID Energy Fractions Dose per Fraction (cGy) Dose Correction (cGy) Total Dose Delivered (cGy) Elapsed Days   BH L APBI 6X 5 / 5 600 0 3,000 9      Treatment dates:  8/14/2024 - 8/23/2024          Review of Systems:  Review of Systems   Constitutional: Negative.    HENT: Negative.     Eyes: Negative.    Respiratory: Negative.     Cardiovascular: Negative.    Gastrointestinal: Negative.    Endocrine: Negative.    Genitourinary: Negative.    Musculoskeletal: Negative.    Skin: Negative.    Allergic/Immunologic: Negative.    Neurological: Negative.    Hematological: Negative.    Psychiatric/Behavioral: Negative.         Clinical Trial: no      Health Maintenance   Topic Date Due    Pneumococcal Vaccine: Pediatrics (0 to 5 Years) and At-Risk Patients (6 to 64 Years) (1 of 2 - PCV) Never done    HIV Screening  Never done    Zoster Vaccine (1 of 2) Never done    Cervical Cancer Screening  Never done    RSV Vaccine for Pregnant Patients and Patients Age 60+ Years (1 - Risk 60-74 years 1-dose series) Never done    COVID-19 Vaccine (3 - Pfizer risk series) 12/21/2021    Influenza Vaccine (1) 09/01/2024    Annual Physical  04/04/2025    Breast Cancer Screening: Mammogram  04/22/2025    BMI: Adult  01/14/2026    Depression Screening  01/27/2026    DTaP,Tdap,and Td Vaccines (2 - Td or Tdap) 06/18/2029    Colorectal Cancer Screening  07/09/2034    Hepatitis C Screening  Completed    Osteoporosis Screening  Completed    Meningococcal B Vaccine  Aged Out    RSV Vaccine age 0-20 Months  Aged Out    HIB Vaccine  Aged Out    IPV Vaccine  Aged Out    Hepatitis A Vaccine  Aged Out    Meningococcal ACWY Vaccine  Aged Out    HPV Vaccine  Aged Out     Patient Active Problem List   Diagnosis    Infiltrating ductal carcinoma  of left breast (HCC)    BRCA gene mutation negative    Malignant neoplasm of upper-outer quadrant of left breast in female, estrogen receptor positive (HCC)    Drug-induced osteoporosis    Drug-induced osteopenia     Past Medical History:   Diagnosis Date    Breast cancer (HCC)      Past Surgical History:   Procedure Laterality Date     SECTION      OK BX/EXC LYMPH NODE OPEN SUPERFICIAL Left 2024    Procedure: DARCIE  LOCALIZED LUMPECTOMY LEFT BREAST W/SLN BX, LYMPHOSCINTIGRAPHY,LYMPHATIC MAPPING;  Surgeon: Svetlana Anguiano MD;  [facility];  Service: Surgical Oncology    OK EXC BREAST LES PREOP PLMT RAD MARKER OPEN 1 LES Left 2024    Procedure: DARCIE  LOCALIZED LUMPECTOMY LEFT BREAST W/SLN BX, LYMPHOSCINTIGRAPHY,LYMPHATIC MAPPING;  Surgeon: Svetlana Anguiano MD;  [facility];  Service: Surgical Oncology    OK INTRAOP SENTINEL LYMPH NODE ID W/DYE INJECTION Left 2024    Procedure: DARCIE  LOCALIZED LUMPECTOMY LEFT BREAST W/SLN BX, LYMPHOSCINTIGRAPHY,LYMPHATIC MAPPING;  Surgeon: Svetlana Anguiano MD;  [facility];  Service: Surgical Oncology    OK MASTECTOMY PARTIAL Left 2024    Procedure: DARCIE  LOCALIZED LUMPECTOMY LEFT BREAST W/SLN BX, LYMPHOSCINTIGRAPHY,LYMPHATIC MAPPING;  Surgeon: Svetlana Anguiano MD;  [facility];  Service: Surgical Oncology    US BREAST NEEDLE LOC LEFT Left 2024     Family History   Problem Relation Age of Onset    Diabetes Mother     Hypertension Mother     No Known Problems Father     No Known Problems Brother      Social History     Socioeconomic History    Marital status: /Civil Union     Spouse name: Not on file    Number of children: Not on file    Years of education: Not on file    Highest education level: Not on file   Occupational History    Not on file   Tobacco Use    Smoking status: Former     Current packs/day: 0.00     Average packs/day: 0.3 packs/day for 5.0 years (1.2 ttl pk-yrs)     Types: Cigarettes      Start date: 6/15/1979     Quit date: 1/15/1984     Years since quittin.0     Passive exposure: Never    Smokeless tobacco: Never   Vaping Use    Vaping status: Never Used   Substance and Sexual Activity    Alcohol use: Yes     Alcohol/week: 8.0 standard drinks of alcohol     Types: 8 Glasses of wine per week     Comment: 1-2 glass of wine nightly. last drank     Drug use: Never    Sexual activity: Not Currently   Other Topics Concern    Not on file   Social History Narrative    Not on file     Social Drivers of Health     Financial Resource Strain: Not on file   Food Insecurity: Not on file   Transportation Needs: Not on file   Physical Activity: Not on file   Stress: Not on file   Social Connections: Not on file   Intimate Partner Violence: Not on file   Housing Stability: Not on file       Current Outpatient Medications:     letrozole (FEMARA) 2.5 mg tablet, Take 1 tablet (2.5 mg total) by mouth in the morning, Disp: 90 tablet, Rfl: 3  Allergies   Allergen Reactions    Erythromycin GI Intolerance    Levaquin [Levofloxacin] Rash    Macrobid [Nitrofurantoin] Rash     Vitals:    25 0918   BP: 126/82   BP Location: Right arm   Patient Position: Sitting   Cuff Size: Standard   Pulse: 77   Resp: 18   Temp: 98.4 °F (36.9 °C)   TempSrc: Temporal   SpO2: 95%      Pain Score: 0-No pain

## 2025-01-27 NOTE — PROGRESS NOTES
Clinical Research Nurse met with patient for her 6 month f/u visit on [facility]  trial, --[id number]. Patient seen by Dr. Aguilar in the Rad Onc office at the [facility], breast and physical exam performed. Pt reports no adverse events or side effects from radiation treatment. Conmeds reviewed, patient taking Letrozole without issue or side effects. Last mammogram was done in 5/2024. Dr. Aguilar placed order for next mammogram and instructed patient to have it done between February and May of this year. Patient was scheduled for her 12 month f/u appt on 7/28/25 with Dr. Aguilar. Patient made aware that optional blood work will be drawn at this appointment.

## 2025-01-28 NOTE — PROGRESS NOTES
Follow-up Visit   Name: Ines Aviles      : 1960      MRN: [id number]  Encounter Provider: Pato Aguilar MD  Encounter Date: 2025   [facility]  :  Assessment & Plan  Infiltrating ductal carcinoma of left breast (HCC)  Approximately 5 months following completion of adjuvant APBI the patient is doing well overall. She remains clinically without evidence of disease recurrence and has additionally recovered nicely from prior RT. As she does not have a mammogram ordered at present, I will order one today, which the patient will schedule for some time between February and May. I will see the patient back in 6 months for continued follow-up.   Orders:    Mammo diagnostic bilateral w 3d and cad; Future        History of Present Illness   Chief Complaint   Patient presents with    Follow-up     Radiation Oncology   Pertinent Medical History   Ms. Ines Aviles is a 64 year old woman with a history of Stage IA (pT1bN0) IDC of the left breast s/p lumpectomy/SLNBx. Pathology demonstrated a 8mm G1 primary, DCIS+ without EIC, LVSI-, 0/4 SLNs involved, margins-, ER+/NV+/Her2-, Ki67 3%. Oncotype was low risk. She enrolled in [facility]  and was randomized to adjuvant RT. On 24 she completed a course of adjuvant partial breast RT to a dose of 3000cGy in 5 fractions delivered EOD. She returns today for follow-up.     Radiation Summary:  L Partial Breast to 3000cGy/5fx EOD, completed 24     Interval History:   The patient was last seen in clinic on 24, at that time doing well having started treatment with letrozole.     Since then she has continued follow up with medical oncology and surgical oncology. She has not undergone repeat mammogram.     Currently she is feeling well overall. She is tolerating letrozole without difficulty. She has no substantial breast pain, edema, erythema, or fibrosis.     Oncology History   Cancer  Staging   Infiltrating ductal carcinoma of left breast (HCC)  Staging form: Breast, AJCC 8th Edition  - Clinical stage from 5/1/2024: Stage IA (cT1, cN0, cM0, G1, ER+, HI+, HER2-) - Signed by Svetlana Anguiano MD on 5/20/2024  Stage prefix: Initial diagnosis  Method of lymph node assessment: Clinical  Histologic grading system: 3 grade system  - Pathologic stage from 6/7/2024: pT1b, pN0(sn), cM0, G2, ER+, HI+ - Signed by Svetlana Anguiano MD on 6/20/2024  Stage prefix: Initial diagnosis  Method of lymph node assessment: [place] lymph node biopsy  Histologic grading system: 3 grade system  Oncology History   Infiltrating ductal carcinoma of left breast (HCC)   5/1/2024 Biopsy    Left breast ultrasound-guided biopsy ([place], [place])  1 o'clock, 4-5 cm from nipple (ribbon)  Invasive ductal carcinoma  Grade 1  , HI 95, HER2 1+, Ki-67 3  One focus suspicious for lymphovascular invasion    Concordant. Malignancy appears unifocal; measures up to 6 mm. No left axillary adenopathy. Right breast clear.     5/1/2024 -  Cancer Staged    Staging form: Breast, AJCC 8th Edition  - Clinical stage from 5/1/2024: Stage IA (cT1, cN0, cM0, G1, ER+, HI+, HER2-) - Signed by Svetlana Anguiano MD on 5/20/2024  Stage prefix: Initial diagnosis  Method of lymph node assessment: Clinical  Histologic grading system: 3 grade system       5/13/2024 Genetic Testing    NEGATIVE: No Clinically Significant Variants Detected  A total of 59 genes were evaluated with RNA insight: APC, FREDA, BAP1, BARD1, BMPR1A, BRCA1, BRCA2, BRIP1, CDH1, CDK4, CDKN1B, CDKN2A, CHEK2, DICER1, FH, FLCN, KIF1B, MAX, MEN1, MET, MLH1, MSH2, MSH6, MUTYH, NF1, NTHL1, PALB2, PMS2, POT1, PTEN, RAD51C, RAD51D, RB1, RET, SDHA, SDHAF2, SDHB, SDHC, SDHD, SMAD4, SMARCA4, STK11, TRIF939, TP53, TSC1, TSC2 and VHL (sequencing and deletion/duplication); AXIN2, CTNNA1, EGLN1, HOXB13, KIT, MITF, MSH3, PDGFRA, POLD1 and POLE (sequencing only); EPCAM and GREM1 (deletion/duplication only).  [place]    "  6/7/2024 Surgery    Left breast DARCIE  directed lumpectomy with SLN biopsy  Invasive carcinoma of no special type (ductal)  Grade 1  8 mm  Margins negative  0/4 lymph nodes     6/7/2024 -  Cancer Staged    Staging form: Breast, AJCC 8th Edition  - Pathologic stage from 6/7/2024: pT1b, pN0(sn), cM0, G2, ER+, W[place]+ - Signed by Svetlana Anguiano MD on 6/20/2024  Stage prefix: Initial diagnosis  Method of lymph node assessment: [place] lymph node biopsy  Histologic grading system: 3 grade system       8/14/2024 - 8/23/2024 Radiation    Treatments:  Course: C1    Plan ID Energy Fractions Dose per Fraction (cGy) Dose Correction (cGy) Total Dose Delivered (cGy) Elapsed Days   BH L APBI 6X 5 / 5 600 0 3,000 9      Treatment dates:  8/14/2024 - 8/23/2024         Review of Systems Refer to nursing note.          Objective   /82 (BP Location: Right arm, Patient Position: Sitting, Cuff Size: Standard)   Pulse 77   Temp 98.4 °F (36.9 °C) (Temporal)   Resp 18   SpO2 95%     Pain Screening:  Pain Score: 0-No pain  ECOG ECOG Performance Status: 0 - Fully active, able to carry on all pre-disease performance without restriction  Physical Exam   Well appearing. NAD.   No increased work of breathing. Lungs CTA bilaterally.   RRR. No murmurs.   Breasts symmetric in shape and contour. No residual skin changes. No masses appreciated in breast apart from minimal post-surgical scarring. No axillary adenopathy. Chaperone present for this exam.   No UE edema. Full ROM in bilateral shoulders.   No skin changes.     Administrative Statements   I have spent a total time of 18 minutes in caring for this patient on the day of the visit/encounter including Counseling / Coordination of care, Reviewing / ordering tests, medicine, procedures  , and Obtaining or reviewing history  .   Portions of the record may have been created with voice recognition software.  Occasional wrong word or \"sound a like\" substitutions may have occurred due to " the inherent limitations of voice recognition software.  Read the chart carefully and recognize, using context, where substitutions have occurred.

## 2025-01-28 NOTE — ASSESSMENT & PLAN NOTE
Approximately 5 months following completion of adjuvant APBI the patient is doing well overall. She remains clinically without evidence of disease recurrence and has additionally recovered nicely from prior RT. As she does not have a mammogram ordered at present, I will order one today, which the patient will schedule for some time between February and May. I will see the patient back in 6 months for continued follow-up.   Orders:    Mammo diagnostic bilateral w 3d and cad; Future

## 2025-04-14 ENCOUNTER — OFFICE VISIT (OUTPATIENT)
Dept: SURGICAL ONCOLOGY | Facility: CLINIC | Age: 65
End: 2025-04-14
Payer: COMMERCIAL

## 2025-04-14 VITALS
SYSTOLIC BLOOD PRESSURE: 128 MMHG | OXYGEN SATURATION: 97 % | WEIGHT: 139 LBS | TEMPERATURE: 97.3 F | HEART RATE: 80 BPM | HEIGHT: 65 IN | BODY MASS INDEX: 23.16 KG/M2 | DIASTOLIC BLOOD PRESSURE: 70 MMHG

## 2025-04-14 DIAGNOSIS — Z12.4 ENCOUNTER FOR SCREENING FOR CERVICAL CANCER: ICD-10-CM

## 2025-04-14 DIAGNOSIS — C50.912 INFILTRATING DUCTAL CARCINOMA OF LEFT BREAST (HCC): Primary | ICD-10-CM

## 2025-04-14 DIAGNOSIS — Z01.419 ENCOUNTER FOR ANNUAL ROUTINE GYNECOLOGICAL EXAMINATION: ICD-10-CM

## 2025-04-14 DIAGNOSIS — Z79.811 AROMATASE INHIBITOR USE: ICD-10-CM

## 2025-04-14 PROCEDURE — 99215 OFFICE O/P EST HI 40 MIN: CPT | Performed by: NURSE PRACTITIONER

## 2025-04-14 NOTE — PROGRESS NOTES
Name: Ines Aviles      : 1960      MRN: [id number]  Encounter Provider: MOHINI Gao  Encounter Date: 2025   Encounter department: [facility] [place]  :  Assessment & Plan  Infiltrating ductal carcinoma of left breast (HCC)    Orders:  •  Ambulatory referral to oncology social worker; Future    Aromatase inhibitor use         Encounter for annual routine gynecological examination    Orders:  •  Ambulatory Referral to Obstetrics / Gynecology; Future    Encounter for screening for cervical cancer    Orders:  •  Ambulatory Referral to Obstetrics / Gynecology; Future    Patient is a 64-year-old female that was diagnosed with a left-sided breast cancer in May 2024.  Her pathology revealed invasive ductal carcinoma, %, FL 95%, HER2 negative.  She underwent genetic testing which was negative.  She underwent a left lumpectomy and sentinel node biopsy with Dr. Anguiano.  Oncotype score was 12.  She completed adjuvant radiation therapy and is currently maintained on letrozole. She is enrolled in clinical trial [facility] .  Breast cancer survivorship visit performed today and treatment summary and care plan were reviewed with patient.    She is tolerating the letrozole well, reports no side effects. She had a DXA in 2024 revealing osteopenia- is taking calcium and vit D supplements.    She has not appreciated any changes on self breast exam and there are no worrisome findings on her clinical exam today.  She is scheduled for a bilateral 3D diagnostic mammogram on 2025.    She is up-to-date on colorectal cancer screening.  She has not had a recent gynecological exam or Pap smear, referral [facility] to [facility] to establish care.    We will see her back in 6 months for a routine follow-up visit or sooner should the need arise.  She was instructed to contact us with any changes or concerns in the interim.  All of her questions  were answered today.        Survivorship  Discussed symptoms related to disease recurrence, Yes     Evaluated for late effects related to cancer treatment, Yes     Screening current for cervical cancer, No referral placed to GYN     Screening current for colon cancer, Yes colonoscopy 7/2024, repeat in 10 years     Cancer rehabilitation services addressed, Yes     Screening current for osteoporosis, Yes dxa 10/2024, osteopenia     Oncology Treatment Summary reviewed with patient and copy provided, Yes     Referral placed for psychosocial evaluation/screening to oncology social work  Yes      Side effects   Surgery- none   Radiation- none   Medical Treatment- none, will monitor bone health    Support Groups- has not participated    Family/Friends- everyone doing well    Healthy Living- walks regularly, will consider [employer] program    Genetics- negative, up to date      History of Present Illness   Ines Aviles is a 64 y.o. year old female who presents today for a breast cancer survivorship visit.  She has completed adjuvant radiation therapy and is now taking letrozole.  She reports no side effects.  She denies persistent headaches, back pain or bone pain, cough, shortness of breath, abdominal pain.    Oncology History   Cancer Staging   Infiltrating ductal carcinoma of left breast (HCC)  Staging form: Breast, AJCC 8th Edition  - Clinical stage from 5/1/2024: Stage IA (cT1, cN0, cM0, G1, ER+, MS+, HER2-) - Signed by Svetlana Anguiano MD on 5/20/2024  Stage prefix: Initial diagnosis  Method of lymph node assessment: Clinical  Histologic grading system: 3 grade system  - Pathologic stage from 6/7/2024: pT1b, pN0(sn), cM0, G2, ER+, MS+ - Signed by Svetlana Anguiano MD on 6/20/2024  Stage prefix: Initial diagnosis  Method of lymph node assessment: [place] lymph node biopsy  Histologic grading system: 3 grade system  Oncology History   Infiltrating ductal carcinoma of left breast (HCC)   5/1/2024 Biopsy    Left breast  ultrasound-guided biopsy ([place], [place])  1 o'clock, 4-5 cm from nipple   Invasive ductal carcinoma  Grade 1  %, AK 95%, HER2 1+, Ki-67 3  One focus suspicious for lymphovascular invasion     5/1/2024 -  Cancer Staged    Staging form: Breast, AJCC 8th Edition  - Clinical stage from 5/1/2024: Stage IA (cT1, cN0, cM0, G1, ER+, AK+, HER2-) - Signed by Svetlana Anguiano MD on 5/20/2024  Stage prefix: Initial diagnosis  Method of lymph node assessment: Clinical  Histologic grading system: 3 grade system       5/13/2024 Genetic Testing    NEGATIVE: No Clinically Significant Variants Detected  A total of 59 genes were evaluated with RNA insight: APC, FREDA, BAP1, BARD1, BMPR1A, BRCA1, BRCA2, BRIP1, CDH1, CDK4, CDKN1B, CDKN2A, CHEK2, DICER1, FH, FLCN, KIF1B, MAX, MEN1, MET, MLH1, MSH2, MSH6, MUTYH, NF1, NTHL1, PALB2, PMS2, POT1, PTEN, RAD51C, RAD51D, RB1, RET, SDHA, SDHAF2, SDHB, SDHC, SDHD, SMAD4, SMARCA4, STK11, GBGY306, TP53, TSC1, TSC2 and VHL (sequencing and deletion/duplication); AXIN2, CTNNA1, EGLN1, HOXB13, KIT, MITF, MSH3, PDGFRA, POLD1 and POLE (sequencing only); EPCAM and GREM1 (deletion/duplication only).  [place]     6/7/2024 Surgery    Left breast lumpectomy with sentinel lymph node biopsy  Invasive ductal carcinoma  Grade 1  8 mm  Margins negative  0/4 lymph nodes    Dr. Anguiano     6/7/2024 -  Cancer Staged    Staging form: Breast, AJCC 8th Edition  - Pathologic stage from 6/7/2024: pT1b, pN0(sn), cM0, G2, ER+, AK+ - Signed by Svetlana Anguiano MD on 6/20/2024  Stage prefix: Initial diagnosis  Method of lymph node assessment: [place] lymph node biopsy  Histologic grading system: 3 grade system        Genomic Testing    Oncotype DX: 12     8/14/2024 - 8/23/2024 Radiation      Plan ID Energy Fractions Dose per Fraction (cGy) Dose Correction (cGy) Total Dose Delivered (cGy) Elapsed Days    L APBI 6X 5 / 5 600 0 3,000 9      Dr. Aguilar     9/2024 -  Hormone Therapy    Letrozole  Dr. Gilliam        Review of Systems  "  Constitutional:  Negative for activity change, appetite change, chills, fatigue, fever and unexpected weight change.   Respiratory:  Negative for cough and shortness of breath.    Cardiovascular:  Negative for chest pain.   Gastrointestinal:  Negative for abdominal pain, constipation, diarrhea, nausea and vomiting.   Musculoskeletal:  Negative for arthralgias, back pain, gait problem and myalgias.   Skin:  Negative for color change and rash.   Neurological:  Negative for dizziness and headaches.   Hematological:  Negative for adenopathy.   Psychiatric/Behavioral:  Negative for agitation and confusion.    All other systems reviewed and are negative.   A complete review of systems is negative other than that noted above in the HPI.           Objective   /70 (BP Location: Right arm, Patient Position: Sitting, Cuff Size: Standard)   Pulse 80   Temp (!) 97.3 °F (36.3 °C) (Temporal)   Ht 5' 5\" (1.651 m)   Wt 63 kg (139 lb)   SpO2 97%   BMI 23.13 kg/m²     Pain Screening:  Pain Score: 0-No pain  ECOG    Physical Exam  Vitals reviewed.   Constitutional:       General: She is not in acute distress.     Appearance: Normal appearance. She is well-developed. She is not diaphoretic.   HENT:      Head: Normocephalic and atraumatic.   Cardiovascular:      Rate and Rhythm: Normal rate and regular rhythm.      Heart sounds: Normal heart sounds.   Pulmonary:      Effort: Pulmonary effort is normal.      Breath sounds: Normal breath sounds.   Chest:   Breasts:     Right: No swelling, bleeding, inverted nipple, mass, nipple discharge, skin change or tenderness.      Left: Skin change (surgical scars) present. No swelling, bleeding, inverted nipple, mass, nipple discharge or tenderness.   Abdominal:      Palpations: Abdomen is soft. There is no mass.      Tenderness: There is no abdominal tenderness.   Musculoskeletal:         General: Normal range of motion.      Cervical back: Normal range of motion.   Lymphadenopathy: "      Upper Body:      Right upper body: No supraclavicular or axillary adenopathy.      Left upper body: No supraclavicular or axillary adenopathy.   Skin:     General: Skin is warm and dry.      Findings: No rash.   Neurological:      Mental Status: She is alert and oriented to person, place, and time.   Psychiatric:         Speech: Speech normal.            Administrative Statements   I have spent a total time of 40 minutes in caring for this patient on the day of the visit/encounter including Prognosis, Risks and benefits of tx options, Instructions for management, Patient and family education, Importance of tx compliance, Risk factor reductions, Impressions, Counseling / Coordination of care, Documenting in the medical record, Reviewing/placing orders in the medical record (including tests, medications, and/or procedures), Obtaining or reviewing history  , and Communicating with other healthcare professionals .

## 2025-04-15 ENCOUNTER — PATIENT OUTREACH (OUTPATIENT)
Dept: CASE MANAGEMENT | Facility: OTHER | Age: 65
End: 2025-04-15

## 2025-04-17 ENCOUNTER — PATIENT OUTREACH (OUTPATIENT)
Dept: CASE MANAGEMENT | Facility: OTHER | Age: 65
End: 2025-04-17

## 2025-04-24 ENCOUNTER — PATIENT OUTREACH (OUTPATIENT)
Dept: CASE MANAGEMENT | Facility: OTHER | Age: 65
End: 2025-04-24

## 2025-05-01 ENCOUNTER — PATIENT OUTREACH (OUTPATIENT)
Dept: CASE MANAGEMENT | Facility: OTHER | Age: 65
End: 2025-05-01

## 2025-05-05 ENCOUNTER — PATIENT OUTREACH (OUTPATIENT)
Dept: CASE MANAGEMENT | Facility: OTHER | Age: 65
End: 2025-05-05

## 2025-06-24 ENCOUNTER — HOSPITAL ENCOUNTER (OUTPATIENT)
Dept: MAMMOGRAPHY | Facility: CLINIC | Age: 65
Discharge: HOME | End: 2025-06-24
Payer: COMMERCIAL

## 2025-06-24 ENCOUNTER — HOSPITAL ENCOUNTER (OUTPATIENT)
Dept: RADIOLOGY | Facility: HOSPITAL | Age: 65
Discharge: HOME | End: 2025-06-24
Attending: RADIOLOGY
Payer: COMMERCIAL

## 2025-06-24 VITALS — BODY MASS INDEX: 23.16 KG/M2 | HEIGHT: 65 IN | WEIGHT: 139 LBS

## 2025-06-24 DIAGNOSIS — Z76.89 REFERRAL OF PATIENT WITHOUT EXAMINATION OR TREATMENT: ICD-10-CM

## 2025-06-24 DIAGNOSIS — C50.912 INFILTRATING DUCTAL CARCINOMA OF LEFT BREAST (HCC): ICD-10-CM

## 2025-06-24 PROCEDURE — 77066 DX MAMMO INCL CAD BI: CPT

## 2025-06-24 PROCEDURE — G0279 TOMOSYNTHESIS, MAMMO: HCPCS

## 2025-06-25 ENCOUNTER — HOSPITAL ENCOUNTER (OUTPATIENT)
Dept: RADIOLOGY | Facility: HOSPITAL | Age: 65
Discharge: HOME | End: 2025-06-25
Attending: RADIOLOGY

## 2025-06-25 DIAGNOSIS — Z76.89 REFERRAL OF PATIENT WITHOUT EXAMINATION OR TREATMENT: ICD-10-CM

## 2025-07-07 DIAGNOSIS — Z17.0 MALIGNANT NEOPLASM OF UPPER-OUTER QUADRANT OF LEFT BREAST IN FEMALE, ESTROGEN RECEPTOR POSITIVE (HCC): Primary | ICD-10-CM

## 2025-07-07 DIAGNOSIS — C50.412 MALIGNANT NEOPLASM OF UPPER-OUTER QUADRANT OF LEFT BREAST IN FEMALE, ESTROGEN RECEPTOR POSITIVE (HCC): Primary | ICD-10-CM

## 2025-07-11 ENCOUNTER — APPOINTMENT (OUTPATIENT)
Dept: LAB | Facility: CLINIC | Age: 65
End: 2025-07-11
Attending: INTERNAL MEDICINE
Payer: COMMERCIAL

## 2025-07-11 DIAGNOSIS — C50.412 MALIGNANT NEOPLASM OF UPPER-OUTER QUADRANT OF LEFT BREAST IN FEMALE, ESTROGEN RECEPTOR POSITIVE (HCC): ICD-10-CM

## 2025-07-11 DIAGNOSIS — Z17.0 MALIGNANT NEOPLASM OF UPPER-OUTER QUADRANT OF LEFT BREAST IN FEMALE, ESTROGEN RECEPTOR POSITIVE (HCC): ICD-10-CM

## 2025-07-11 DIAGNOSIS — E78.9 BORDERLINE HIGH CHOLESTEROL: ICD-10-CM

## 2025-07-11 LAB
ALBUMIN SERPL BCG-MCNC: 4 G/DL (ref 3.5–5)
ALP SERPL-CCNC: 83 U/L (ref 34–104)
ALT SERPL W P-5'-P-CCNC: 12 U/L (ref 7–52)
ANION GAP SERPL CALCULATED.3IONS-SCNC: 7 MMOL/L (ref 4–13)
AST SERPL W P-5'-P-CCNC: 14 U/L (ref 13–39)
BASOPHILS # BLD AUTO: 0.05 THOUSANDS/ÂΜL (ref 0–0.1)
BASOPHILS NFR BLD AUTO: 1 % (ref 0–1)
BILIRUB SERPL-MCNC: 0.41 MG/DL (ref 0.2–1)
BUN SERPL-MCNC: 16 MG/DL (ref 5–25)
CALCIUM SERPL-MCNC: 9 MG/DL (ref 8.4–10.2)
CHLORIDE SERPL-SCNC: 106 MMOL/L (ref 96–108)
CO2 SERPL-SCNC: 27 MMOL/L (ref 21–32)
CREAT SERPL-MCNC: 0.76 MG/DL (ref 0.6–1.3)
EOSINOPHIL # BLD AUTO: 0.07 THOUSAND/ÂΜL (ref 0–0.61)
EOSINOPHIL NFR BLD AUTO: 1 % (ref 0–6)
ERYTHROCYTE [DISTWIDTH] IN BLOOD BY AUTOMATED COUNT: 12.8 % (ref 11.6–15.1)
GFR SERPL CREATININE-BSD FRML MDRD: 83 ML/MIN/1.73SQ M
GLUCOSE SERPL-MCNC: 169 MG/DL (ref 65–140)
HCT VFR BLD AUTO: 41.2 % (ref 34.8–46.1)
HGB BLD-MCNC: 13.4 G/DL (ref 11.5–15.4)
IMM GRANULOCYTES # BLD AUTO: 0.02 THOUSAND/UL (ref 0–0.2)
IMM GRANULOCYTES NFR BLD AUTO: 0 % (ref 0–2)
LYMPHOCYTES # BLD AUTO: 2.19 THOUSANDS/ÂΜL (ref 0.6–4.47)
LYMPHOCYTES NFR BLD AUTO: 31 % (ref 14–44)
MCH RBC QN AUTO: 29.8 PG (ref 26.8–34.3)
MCHC RBC AUTO-ENTMCNC: 32.5 G/DL (ref 31.4–37.4)
MCV RBC AUTO: 92 FL (ref 82–98)
MONOCYTES # BLD AUTO: 0.39 THOUSAND/ÂΜL (ref 0.17–1.22)
MONOCYTES NFR BLD AUTO: 6 % (ref 4–12)
NEUTROPHILS # BLD AUTO: 4.26 THOUSANDS/ÂΜL (ref 1.85–7.62)
NEUTS SEG NFR BLD AUTO: 61 % (ref 43–75)
NRBC BLD AUTO-RTO: 0 /100 WBCS
PLATELET # BLD AUTO: 306 THOUSANDS/UL (ref 149–390)
PMV BLD AUTO: 9.9 FL (ref 8.9–12.7)
POTASSIUM SERPL-SCNC: 4.1 MMOL/L (ref 3.5–5.3)
PROT SERPL-MCNC: 6.4 G/DL (ref 6.4–8.4)
RBC # BLD AUTO: 4.49 MILLION/UL (ref 3.81–5.12)
SODIUM SERPL-SCNC: 140 MMOL/L (ref 135–147)
WBC # BLD AUTO: 6.98 THOUSAND/UL (ref 4.31–10.16)

## 2025-07-11 PROCEDURE — 36415 COLL VENOUS BLD VENIPUNCTURE: CPT

## 2025-07-11 PROCEDURE — 80053 COMPREHEN METABOLIC PANEL: CPT

## 2025-07-11 PROCEDURE — 85025 COMPLETE CBC W/AUTO DIFF WBC: CPT

## 2025-07-15 ENCOUNTER — OFFICE VISIT (OUTPATIENT)
Dept: HEMATOLOGY ONCOLOGY | Facility: CLINIC | Age: 65
End: 2025-07-15
Payer: COMMERCIAL

## 2025-07-15 VITALS
RESPIRATION RATE: 14 BRPM | HEIGHT: 65 IN | TEMPERATURE: 97.3 F | SYSTOLIC BLOOD PRESSURE: 112 MMHG | BODY MASS INDEX: 23.99 KG/M2 | OXYGEN SATURATION: 98 % | DIASTOLIC BLOOD PRESSURE: 68 MMHG | WEIGHT: 144 LBS | HEART RATE: 69 BPM

## 2025-07-15 DIAGNOSIS — T50.905A DRUG-INDUCED OSTEOPENIA: ICD-10-CM

## 2025-07-15 DIAGNOSIS — C50.412 MALIGNANT NEOPLASM OF UPPER-OUTER QUADRANT OF LEFT BREAST IN FEMALE, ESTROGEN RECEPTOR POSITIVE (HCC): Primary | ICD-10-CM

## 2025-07-15 DIAGNOSIS — Z17.0 MALIGNANT NEOPLASM OF UPPER-OUTER QUADRANT OF LEFT BREAST IN FEMALE, ESTROGEN RECEPTOR POSITIVE (HCC): Primary | ICD-10-CM

## 2025-07-15 DIAGNOSIS — M85.80 DRUG-INDUCED OSTEOPENIA: ICD-10-CM

## 2025-07-15 DIAGNOSIS — Z79.811 AROMATASE INHIBITOR USE: ICD-10-CM

## 2025-07-15 PROCEDURE — 99214 OFFICE O/P EST MOD 30 MIN: CPT | Performed by: INTERNAL MEDICINE

## 2025-07-15 RX ORDER — LETROZOLE 2.5 MG/1
2.5 TABLET, FILM COATED ORAL DAILY
Qty: 90 TABLET | Refills: 3 | Status: SHIPPED | OUTPATIENT
Start: 2025-07-15

## 2025-07-28 ENCOUNTER — DOCUMENTATION (OUTPATIENT)
Dept: OTHER | Facility: HOSPITAL | Age: 65
End: 2025-07-28

## 2025-07-28 ENCOUNTER — OFFICE VISIT (OUTPATIENT)
Dept: RADIATION ONCOLOGY | Facility: HOSPITAL | Age: 65
End: 2025-07-28
Attending: STUDENT IN AN ORGANIZED HEALTH CARE EDUCATION/TRAINING PROGRAM
Payer: COMMERCIAL

## 2025-07-28 VITALS
OXYGEN SATURATION: 98 % | RESPIRATION RATE: 18 BRPM | HEART RATE: 79 BPM | TEMPERATURE: 98.5 F | BODY MASS INDEX: 23.99 KG/M2 | HEIGHT: 65 IN | SYSTOLIC BLOOD PRESSURE: 122 MMHG | DIASTOLIC BLOOD PRESSURE: 84 MMHG | WEIGHT: 144 LBS

## 2025-07-28 DIAGNOSIS — C50.912 INFILTRATING DUCTAL CARCINOMA OF LEFT BREAST (HCC): Primary | ICD-10-CM

## 2025-07-28 PROCEDURE — 99213 OFFICE O/P EST LOW 20 MIN: CPT | Performed by: STUDENT IN AN ORGANIZED HEALTH CARE EDUCATION/TRAINING PROGRAM

## 2025-07-28 PROCEDURE — 99211 OFF/OP EST MAY X REQ PHY/QHP: CPT | Performed by: STUDENT IN AN ORGANIZED HEALTH CARE EDUCATION/TRAINING PROGRAM

## (undated) DEVICE — TUBING SUCTION 5MM X 12 FT

## (undated) DEVICE — DRAPE SHEET THREE QUARTER

## (undated) DEVICE — DRAPE EQUIPMENT RF WAND

## (undated) DEVICE — BETHLEHEM UNIVERSAL MINOR GEN: Brand: CARDINAL HEALTH

## (undated) DEVICE — SHEATH, GUIDE, SAVI SCOUT®: Brand: SAVI SCOUT®

## (undated) DEVICE — SUPER SPONGES,MEDIUM: Brand: KERLIX

## (undated) DEVICE — SCD SEQUENTIAL COMPRESSION COMFORT SLEEVE MEDIUM KNEE LENGTH: Brand: KENDALL SCD

## (undated) DEVICE — CHLORAPREP HI-LITE 26ML ORANGE

## (undated) DEVICE — SUT SILK 2-0 SH 30 IN K833H

## (undated) DEVICE — MEDI-VAC YANKAUER SUCTION HANDLE W/BULBOUS AND CONTROL VENT: Brand: CARDINAL HEALTH

## (undated) DEVICE — NEEDLE 25G X 1 1/2

## (undated) DEVICE — LIGACLIP MCA MULTIPLE CLIP APPLIERS, 20 SMALL CLIPS: Brand: LIGACLIP

## (undated) DEVICE — SUT MONOCRYL 4-0 PS-2 27 IN Y426H

## (undated) DEVICE — GLOVE SRG BIOGEL 6

## (undated) DEVICE — SINGLE PORT MANIFOLD: Brand: NEPTUNE 2

## (undated) DEVICE — PLUMEPEN PRO 10FT

## (undated) DEVICE — SPECIAMEN GRID KLINITRAY SM LT

## (undated) DEVICE — SUT MONOCRYL 3-0 SH 27 IN Y416H

## (undated) DEVICE — EXOFIN PRECISION PEN HIGH VISCOSITY TOPICAL SKIN ADHESIVE: Brand: EXOFIN PRECISION PEN, 1G